# Patient Record
Sex: MALE | Race: OTHER | HISPANIC OR LATINO | URBAN - METROPOLITAN AREA
[De-identification: names, ages, dates, MRNs, and addresses within clinical notes are randomized per-mention and may not be internally consistent; named-entity substitution may affect disease eponyms.]

---

## 2017-04-14 ENCOUNTER — INPATIENT (INPATIENT)
Facility: HOSPITAL | Age: 67
LOS: 0 days | Discharge: HOME | DRG: 247 | End: 2017-04-15
Attending: INTERNAL MEDICINE | Admitting: INTERNAL MEDICINE
Payer: COMMERCIAL

## 2017-04-14 VITALS
OXYGEN SATURATION: 96 % | RESPIRATION RATE: 16 BRPM | TEMPERATURE: 98 F | HEART RATE: 85 BPM | HEIGHT: 63 IN | WEIGHT: 186.95 LBS | DIASTOLIC BLOOD PRESSURE: 78 MMHG | SYSTOLIC BLOOD PRESSURE: 158 MMHG

## 2017-04-14 DIAGNOSIS — R93.1 ABNORMAL FINDINGS ON DIAGNOSTIC IMAGING OF HEART AND CORONARY CIRCULATION: ICD-10-CM

## 2017-04-14 DIAGNOSIS — Z87.81 PERSONAL HISTORY OF (HEALED) TRAUMATIC FRACTURE: Chronic | ICD-10-CM

## 2017-04-14 LAB
ALBUMIN SERPL ELPH-MCNC: 4.2 G/DL — SIGNIFICANT CHANGE UP (ref 3.3–5)
ALP SERPL-CCNC: 90 U/L — SIGNIFICANT CHANGE UP (ref 40–120)
ALT FLD-CCNC: 29 U/L RC — SIGNIFICANT CHANGE UP (ref 10–45)
ANION GAP SERPL CALC-SCNC: 12 MMOL/L — SIGNIFICANT CHANGE UP (ref 5–17)
AST SERPL-CCNC: 18 U/L — SIGNIFICANT CHANGE UP (ref 10–40)
BILIRUB SERPL-MCNC: 0.5 MG/DL — SIGNIFICANT CHANGE UP (ref 0.2–1.2)
BUN SERPL-MCNC: 38 MG/DL — HIGH (ref 7–23)
CALCIUM SERPL-MCNC: 9.2 MG/DL — SIGNIFICANT CHANGE UP (ref 8.4–10.5)
CHLORIDE SERPL-SCNC: 105 MMOL/L — SIGNIFICANT CHANGE UP (ref 96–108)
CO2 SERPL-SCNC: 27 MMOL/L — SIGNIFICANT CHANGE UP (ref 22–31)
CREAT SERPL-MCNC: 1.6 MG/DL — HIGH (ref 0.5–1.3)
GLUCOSE SERPL-MCNC: 84 MG/DL — SIGNIFICANT CHANGE UP (ref 70–99)
HCT VFR BLD CALC: 51 % — HIGH (ref 39–50)
HGB BLD-MCNC: 15.9 G/DL — SIGNIFICANT CHANGE UP (ref 13–17)
MCHC RBC-ENTMCNC: 28.9 PG — SIGNIFICANT CHANGE UP (ref 27–34)
MCHC RBC-ENTMCNC: 31.3 GM/DL — LOW (ref 32–36)
MCV RBC AUTO: 92.4 FL — SIGNIFICANT CHANGE UP (ref 80–100)
PLATELET # BLD AUTO: 233 K/UL — SIGNIFICANT CHANGE UP (ref 150–400)
POTASSIUM SERPL-MCNC: 4.8 MMOL/L — SIGNIFICANT CHANGE UP (ref 3.5–5.3)
POTASSIUM SERPL-SCNC: 4.8 MMOL/L — SIGNIFICANT CHANGE UP (ref 3.5–5.3)
PROT SERPL-MCNC: 7.3 G/DL — SIGNIFICANT CHANGE UP (ref 6–8.3)
RBC # BLD: 5.52 M/UL — SIGNIFICANT CHANGE UP (ref 4.2–5.8)
RBC # FLD: 13.8 % — SIGNIFICANT CHANGE UP (ref 10.3–14.5)
SODIUM SERPL-SCNC: 144 MMOL/L — SIGNIFICANT CHANGE UP (ref 135–145)
WBC # BLD: 9.3 K/UL — SIGNIFICANT CHANGE UP (ref 3.8–10.5)
WBC # FLD AUTO: 9.3 K/UL — SIGNIFICANT CHANGE UP (ref 3.8–10.5)

## 2017-04-14 PROCEDURE — 93010 ELECTROCARDIOGRAM REPORT: CPT

## 2017-04-14 RX ORDER — AMLODIPINE BESYLATE 2.5 MG/1
5 TABLET ORAL DAILY
Qty: 0 | Refills: 0 | Status: DISCONTINUED | OUTPATIENT
Start: 2017-04-14 | End: 2017-04-15

## 2017-04-14 RX ORDER — LOSARTAN POTASSIUM 100 MG/1
100 TABLET, FILM COATED ORAL DAILY
Qty: 0 | Refills: 0 | Status: DISCONTINUED | OUTPATIENT
Start: 2017-04-14 | End: 2017-04-15

## 2017-04-14 RX ORDER — PRASUGREL 5 MG/1
10 TABLET, FILM COATED ORAL DAILY
Qty: 0 | Refills: 0 | Status: DISCONTINUED | OUTPATIENT
Start: 2017-04-15 | End: 2017-04-15

## 2017-04-14 RX ORDER — INDOMETHACIN 50 MG
75 CAPSULE ORAL DAILY
Qty: 0 | Refills: 0 | Status: DISCONTINUED | OUTPATIENT
Start: 2017-04-14 | End: 2017-04-15

## 2017-04-14 RX ORDER — ATORVASTATIN CALCIUM 80 MG/1
40 TABLET, FILM COATED ORAL AT BEDTIME
Qty: 0 | Refills: 0 | Status: DISCONTINUED | OUTPATIENT
Start: 2017-04-14 | End: 2017-04-15

## 2017-04-14 RX ORDER — ASPIRIN/CALCIUM CARB/MAGNESIUM 324 MG
81 TABLET ORAL DAILY
Qty: 0 | Refills: 0 | Status: DISCONTINUED | OUTPATIENT
Start: 2017-04-14 | End: 2017-04-15

## 2017-04-14 RX ADMIN — ATORVASTATIN CALCIUM 40 MILLIGRAM(S): 80 TABLET, FILM COATED ORAL at 21:54

## 2017-04-14 NOTE — H&P CARDIOLOGY - HISTORY OF PRESENT ILLNESS
66 year old male with PMH HTN, gout TIA 6 months ago, no residual who presents with midsternal chest pain, unrelated to activity, non radiating. HAs had pain for "years" but endorses increase in pain over past 6 months, accompanied by dizziness. Recent Cardiac CT with calcium score >400. Calcium plaque seen throughout coronaries, including left main. Symptoms may suggest angina.   Nuclear stress test 3/7/17, which revealed EF 69%. Negative for stress induced ischemia, symptoms or arrythmia. No evidence of ischemia or antecedent infarction. Normal LV function. Seen and evaluated by Dr German New and referred for cardiac cath with possible anginal component.

## 2017-04-15 VITALS
OXYGEN SATURATION: 98 % | SYSTOLIC BLOOD PRESSURE: 147 MMHG | HEART RATE: 77 BPM | DIASTOLIC BLOOD PRESSURE: 103 MMHG | RESPIRATION RATE: 17 BRPM

## 2017-04-15 LAB
ANION GAP SERPL CALC-SCNC: 12 MMOL/L — SIGNIFICANT CHANGE UP (ref 5–17)
BUN SERPL-MCNC: 34 MG/DL — HIGH (ref 7–23)
CALCIUM SERPL-MCNC: 8.6 MG/DL — SIGNIFICANT CHANGE UP (ref 8.4–10.5)
CHLORIDE SERPL-SCNC: 105 MMOL/L — SIGNIFICANT CHANGE UP (ref 96–108)
CO2 SERPL-SCNC: 23 MMOL/L — SIGNIFICANT CHANGE UP (ref 22–31)
CREAT SERPL-MCNC: 1.41 MG/DL — HIGH (ref 0.5–1.3)
GLUCOSE SERPL-MCNC: 85 MG/DL — SIGNIFICANT CHANGE UP (ref 70–99)
HCT VFR BLD CALC: 49.2 % — SIGNIFICANT CHANGE UP (ref 39–50)
HGB BLD-MCNC: 16 G/DL — SIGNIFICANT CHANGE UP (ref 13–17)
MCHC RBC-ENTMCNC: 29.7 PG — SIGNIFICANT CHANGE UP (ref 27–34)
MCHC RBC-ENTMCNC: 32.4 GM/DL — SIGNIFICANT CHANGE UP (ref 32–36)
MCV RBC AUTO: 91.6 FL — SIGNIFICANT CHANGE UP (ref 80–100)
PLATELET # BLD AUTO: 207 K/UL — SIGNIFICANT CHANGE UP (ref 150–400)
POTASSIUM SERPL-MCNC: 4 MMOL/L — SIGNIFICANT CHANGE UP (ref 3.5–5.3)
POTASSIUM SERPL-SCNC: 4 MMOL/L — SIGNIFICANT CHANGE UP (ref 3.5–5.3)
RBC # BLD: 5.38 M/UL — SIGNIFICANT CHANGE UP (ref 4.2–5.8)
RBC # FLD: 13.6 % — SIGNIFICANT CHANGE UP (ref 10.3–14.5)
SODIUM SERPL-SCNC: 140 MMOL/L — SIGNIFICANT CHANGE UP (ref 135–145)
WBC # BLD: 8.6 K/UL — SIGNIFICANT CHANGE UP (ref 3.8–10.5)
WBC # FLD AUTO: 8.6 K/UL — SIGNIFICANT CHANGE UP (ref 3.8–10.5)

## 2017-04-15 PROCEDURE — C1725: CPT

## 2017-04-15 PROCEDURE — C1874: CPT

## 2017-04-15 PROCEDURE — 80053 COMPREHEN METABOLIC PANEL: CPT

## 2017-04-15 PROCEDURE — 80048 BASIC METABOLIC PNL TOTAL CA: CPT

## 2017-04-15 PROCEDURE — 93005 ELECTROCARDIOGRAM TRACING: CPT

## 2017-04-15 PROCEDURE — C1894: CPT

## 2017-04-15 PROCEDURE — C1769: CPT

## 2017-04-15 PROCEDURE — 85027 COMPLETE CBC AUTOMATED: CPT

## 2017-04-15 PROCEDURE — 93458 L HRT ARTERY/VENTRICLE ANGIO: CPT | Mod: 59

## 2017-04-15 PROCEDURE — 93010 ELECTROCARDIOGRAM REPORT: CPT

## 2017-04-15 PROCEDURE — C9600: CPT | Mod: RC

## 2017-04-15 PROCEDURE — C1887: CPT

## 2017-04-15 RX ORDER — ATORVASTATIN CALCIUM 80 MG/1
1 TABLET, FILM COATED ORAL
Qty: 30 | Refills: 0 | OUTPATIENT
Start: 2017-04-15 | End: 2017-05-15

## 2017-04-15 RX ORDER — ASPIRIN/CALCIUM CARB/MAGNESIUM 324 MG
1 TABLET ORAL
Qty: 0 | Refills: 0 | COMMUNITY
Start: 2017-04-15

## 2017-04-15 RX ORDER — PRASUGREL 5 MG/1
1 TABLET, FILM COATED ORAL
Qty: 90 | Refills: 3 | OUTPATIENT
Start: 2017-04-15 | End: 2018-04-09

## 2017-04-15 RX ORDER — PRASUGREL 5 MG/1
1 TABLET, FILM COATED ORAL
Qty: 30 | Refills: 0 | OUTPATIENT
Start: 2017-04-15 | End: 2017-05-15

## 2017-04-15 RX ADMIN — PRASUGREL 10 MILLIGRAM(S): 5 TABLET, FILM COATED ORAL at 05:51

## 2017-04-15 RX ADMIN — LOSARTAN POTASSIUM 100 MILLIGRAM(S): 100 TABLET, FILM COATED ORAL at 05:52

## 2017-04-15 RX ADMIN — Medication 81 MILLIGRAM(S): at 05:52

## 2017-04-15 RX ADMIN — AMLODIPINE BESYLATE 5 MILLIGRAM(S): 2.5 TABLET ORAL at 05:52

## 2017-04-15 NOTE — DISCHARGE NOTE ADULT - CARE PROVIDER_API CALL
German New), Cardiac Electrophysiology; Cardiovascular Disease  [address] [place] [address]  Phone: [phone]  Fax: [phone]

## 2017-04-15 NOTE — DISCHARGE NOTE ADULT - MEDICATION SUMMARY - MEDICATIONS TO TAKE
I will START or STAY ON the medications listed below when I get home from the hospital:    aspirin 81 mg oral delayed release tablet  -- 1 tab(s) by mouth once a day  -- Indication: For stented coronary artery    indomethacin 75 mg oral capsule, extended release  -- 1 cap(s) by mouth once a day  -- Indication: For pain    losartan 100 mg oral tablet  -- 1 tab(s) by mouth once a day  -- Indication: For Hypertension    atorvastatin 40 mg oral tablet  -- 1 tab(s) by mouth once a day (at bedtime)  -- Indication: For Hyperlipidemia    prasugrel 10 mg oral tablet  -- 1 tab(s) by mouth once a day  -- Indication: For stented coronary artery    Effient 10 mg oral tablet  -- 1 tab(s) by mouth once a day  -- Indication: For stented coronary artery    amLODIPine 5 mg oral tablet  -- 1 tab(s) by mouth once a day  -- Indication: For Hypertension

## 2017-04-15 NOTE — DISCHARGE NOTE ADULT - CARE PLAN
Principal Discharge DX:	Coronary artery disease of native artery of native heart with stable angina pectoris  Goal:	Patient remains chest pain free and understands post cath discharge instructions.  Instructions for follow-up, activity and diet:	No heavy lifting, strenuous activity, bending, straining, or unnecessary stair climbing for 2 weeks. No driving for 2 days. You may shower 24 hours following the procedure but avoid baths/swimming for 1 week. Check your groin site for bleeding and/or swelling daily following procedure and call your doctor immediately if it occurs or if you experience increased pain at the site. Follow up with your cardiologist in 1-2 weeks. You may call [facility] if you have any questions/concerns regarding your procedure [phone]. Do not stop you Aspirin or Effient unless instructed to do so by your cardiologist. Low salt, low fat diet.   Weight management.   Take medications as prescribed.    No smoking.  Follow up appointments with your doctor(s)  as instructed.  Secondary Diagnosis:	HTN (hypertension), benign  Goal:	Your blood pressure will be controlled.  Instructions for follow-up, activity and diet:	Continue with your blood pressure medications; eat a heart healthy diet with low salt diet; exercise regularly (consult with your physician or cardiologist first); maintain a heart healthy weight; if you smoke - quit (A resource to help you stop smoking is the [facility] [employer] – phone number [phone].); include healthy ways to manage stress. Continue to follow with your primary care physician or cardiologist.  Secondary Diagnosis:	Hyperlipidemia, unspecified hyperlipidemia type  Goal:	Your LDL cholesterol will be less than 70mg/dL  Instructions for follow-up, activity and diet:	Continue with your cholesterol medications. Eat a heart healthy diet that is low in saturated fats and salt, and includes whole grains, fruits, vegetables and lean protein; exercise regularly (consult with your physician or cardiologist first); maintain a heart healthy weight; if you smoke - quit (A resource to help you stop smoking is the [facility] [employer] – phone number [phone].). Continue to follow with your primary physician or cardiologist. Principal Discharge DX:	Coronary artery disease of native artery of native heart with stable angina pectoris  Goal:	Patient remains chest pain free and understands post cath discharge instructions.  Instructions for follow-up, activity and diet:	No heavy lifting, strenuous activity, bending, straining, or unnecessary stair climbing for 2 weeks. No driving for 2 days. You may shower 24 hours following the procedure but avoid baths/swimming for 1 week. Check your groin site for bleeding and/or swelling daily following procedure and call your doctor immediately if it occurs or if you experience increased pain at the site. Follow up with your cardiologist in 1-2 weeks. You may call [facility] if you have any questions/concerns regarding your procedure [phone]. Do not stop you Aspirin or Effient unless instructed to do so by your cardiologist. Low salt, low fat diet.   Weight management.   Take medications as prescribed.    No smoking.  Follow up appointments with your doctor(s)  as instructed.  Secondary Diagnosis:	HTN (hypertension), benign  Goal:	Your blood pressure will be controlled.  Instructions for follow-up, activity and diet:	Continue with your blood pressure medications; eat a heart healthy diet with low salt diet; exercise regularly (consult with your physician or cardiologist first); maintain a heart healthy weight; if you smoke - quit (A resource to help you stop smoking is the [facility] – phone number [phone].); include healthy ways to manage stress. Continue to follow with your primary care physician or cardiologist.  Secondary Diagnosis:	Hyperlipidemia, unspecified hyperlipidemia type  Goal:	Your LDL cholesterol will be less than 70mg/dL  Instructions for follow-up, activity and diet:	Continue with your cholesterol medications. Eat a heart healthy diet that is low in saturated fats and salt, and includes whole grains, fruits, vegetables and lean protein; exercise regularly (consult with your physician or cardiologist first); maintain a heart healthy weight; if you smoke - quit (A resource to help you stop smoking is the [facility] – phone number [phone].). Continue to follow with your primary physician or cardiologist.

## 2017-04-15 NOTE — DISCHARGE NOTE ADULT - PATIENT PORTAL LINK FT
“You can access the Follow[facility] Patient Portal, offered by [facility], by registering with the following website: [email or web address]”

## 2017-04-15 NOTE — DISCHARGE NOTE ADULT - HOSPITAL COURSE
66 year old male with PMH HTN, gout TIA 6 months ago, no residual who presents with midsternal chest pain, unrelated to activity, non radiating. HAs had pain for "years" but endorses increase in pain over past 6 months, accompanied by dizziness. Recent Cardiac CT with calcium score >400. Calcium plaque seen throughout coronaries, including left main. Symptoms may suggest angina.   Nuclear stress test 3/7/17, which revealed EF 69%. Negative for stress induced ischemia, symptoms or arrythmia. No evidence of ischemia or antecedent infarction. Normal LV function. Seen and evaluated by Dr German New and referred for cardiac cath with possible anginal component.    4/14 cardiac cath with stent to the mid RCA. Right femoral insertion site without swelling, bleeding.

## 2017-04-15 NOTE — DISCHARGE NOTE ADULT - PLAN OF CARE
Patient remains chest pain free and understands post cath discharge instructions. No heavy lifting, strenuous activity, bending, straining, or unnecessary stair climbing for 2 weeks. No driving for 2 days. You may shower 24 hours following the procedure but avoid baths/swimming for 1 week. Check your groin site for bleeding and/or swelling daily following procedure and call your doctor immediately if it occurs or if you experience increased pain at the site. Follow up with your cardiologist in 1-2 weeks. You may call [facility] if you have any questions/concerns regarding your procedure [phone]. Do not stop you Aspirin or Effient unless instructed to do so by your cardiologist. Low salt, low fat diet.   Weight management.   Take medications as prescribed.    No smoking.  Follow up appointments with your doctor(s)  as instructed. Your blood pressure will be controlled. Continue with your blood pressure medications; eat a heart healthy diet with low salt diet; exercise regularly (consult with your physician or cardiologist first); maintain a heart healthy weight; if you smoke - quit (A resource to help you stop smoking is the [facility] – phone number [phone].); include healthy ways to manage stress. Continue to follow with your primary care physician or cardiologist. Your LDL cholesterol will be less than 70mg/dL Continue with your cholesterol medications. Eat a heart healthy diet that is low in saturated fats and salt, and includes whole grains, fruits, vegetables and lean protein; exercise regularly (consult with your physician or cardiologist first); maintain a heart healthy weight; if you smoke - quit (A resource to help you stop smoking is the [facility] – phone number [phone].). Continue to follow with your primary physician or cardiologist.

## 2017-04-29 ENCOUNTER — EMERGENCY (EMERGENCY)
Facility: HOSPITAL | Age: 67
LOS: 0 days | Discharge: HOME | End: 2017-04-29
Attending: EMERGENCY MEDICINE | Admitting: EMERGENCY MEDICINE
Payer: MEDICARE

## 2017-04-29 VITALS
DIASTOLIC BLOOD PRESSURE: 66 MMHG | TEMPERATURE: 98 F | WEIGHT: 188.94 LBS | RESPIRATION RATE: 15 BRPM | SYSTOLIC BLOOD PRESSURE: 118 MMHG | HEART RATE: 88 BPM | HEIGHT: 63 IN | OXYGEN SATURATION: 98 %

## 2017-04-29 VITALS
DIASTOLIC BLOOD PRESSURE: 62 MMHG | OXYGEN SATURATION: 98 % | SYSTOLIC BLOOD PRESSURE: 118 MMHG | HEART RATE: 75 BPM | RESPIRATION RATE: 17 BRPM | TEMPERATURE: 99 F

## 2017-04-29 DIAGNOSIS — R07.9 CHEST PAIN, UNSPECIFIED: ICD-10-CM

## 2017-04-29 DIAGNOSIS — Z87.81 PERSONAL HISTORY OF (HEALED) TRAUMATIC FRACTURE: Chronic | ICD-10-CM

## 2017-04-29 LAB
ALBUMIN SERPL ELPH-MCNC: 3.2 G/DL — LOW (ref 3.3–5)
ALP SERPL-CCNC: 84 U/L — SIGNIFICANT CHANGE UP (ref 40–120)
ALT FLD-CCNC: 28 U/L — SIGNIFICANT CHANGE UP (ref 12–78)
ANION GAP SERPL CALC-SCNC: 10 MMOL/L — SIGNIFICANT CHANGE UP (ref 5–17)
APTT BLD: 30.8 SEC — SIGNIFICANT CHANGE UP (ref 27.5–37.4)
AST SERPL-CCNC: 12 U/L — LOW (ref 15–37)
BASOPHILS # BLD AUTO: 0.1 K/UL — SIGNIFICANT CHANGE UP (ref 0–0.2)
BASOPHILS NFR BLD AUTO: 1 % — SIGNIFICANT CHANGE UP (ref 0–2)
BILIRUB SERPL-MCNC: 0.4 MG/DL — SIGNIFICANT CHANGE UP (ref 0.2–1.2)
BUN SERPL-MCNC: 36 MG/DL — HIGH (ref 7–23)
CALCIUM SERPL-MCNC: 8.5 MG/DL — SIGNIFICANT CHANGE UP (ref 8.5–10.1)
CHLORIDE SERPL-SCNC: 107 MMOL/L — SIGNIFICANT CHANGE UP (ref 96–108)
CK SERPL-CCNC: 55 U/L — SIGNIFICANT CHANGE UP (ref 26–308)
CO2 SERPL-SCNC: 25 MMOL/L — SIGNIFICANT CHANGE UP (ref 22–31)
CREAT SERPL-MCNC: 1.51 MG/DL — HIGH (ref 0.5–1.3)
EOSINOPHIL # BLD AUTO: 0.3 K/UL — SIGNIFICANT CHANGE UP (ref 0–0.5)
EOSINOPHIL NFR BLD AUTO: 2.2 % — SIGNIFICANT CHANGE UP (ref 0–6)
GLUCOSE SERPL-MCNC: 114 MG/DL — HIGH (ref 70–99)
HCT VFR BLD CALC: 42.2 % — SIGNIFICANT CHANGE UP (ref 39–50)
HGB BLD-MCNC: 13.6 G/DL — SIGNIFICANT CHANGE UP (ref 13–17)
INR BLD: 1.11 RATIO — SIGNIFICANT CHANGE UP (ref 0.88–1.16)
LYMPHOCYTES # BLD AUTO: 1.4 K/UL — SIGNIFICANT CHANGE UP (ref 1–3.3)
LYMPHOCYTES # BLD AUTO: 10.8 % — LOW (ref 13–44)
MCHC RBC-ENTMCNC: 29 PG — SIGNIFICANT CHANGE UP (ref 27–34)
MCHC RBC-ENTMCNC: 32.4 GM/DL — SIGNIFICANT CHANGE UP (ref 32–36)
MCV RBC AUTO: 89.5 FL — SIGNIFICANT CHANGE UP (ref 80–100)
MONOCYTES # BLD AUTO: 1 K/UL — HIGH (ref 0–0.9)
MONOCYTES NFR BLD AUTO: 7.9 % — SIGNIFICANT CHANGE UP (ref 2–14)
NEUTROPHILS # BLD AUTO: 9.9 K/UL — HIGH (ref 1.8–7.4)
NEUTROPHILS NFR BLD AUTO: 78 % — HIGH (ref 43–77)
NT-PROBNP SERPL-SCNC: 311 PG/ML — HIGH (ref 0–125)
PLATELET # BLD AUTO: 297 K/UL — SIGNIFICANT CHANGE UP (ref 150–400)
POTASSIUM SERPL-MCNC: 4.2 MMOL/L — SIGNIFICANT CHANGE UP (ref 3.5–5.3)
POTASSIUM SERPL-SCNC: 4.2 MMOL/L — SIGNIFICANT CHANGE UP (ref 3.5–5.3)
PROT SERPL-MCNC: 6.8 GM/DL — SIGNIFICANT CHANGE UP (ref 6–8.3)
PROTHROM AB SERPL-ACNC: 12 SEC — SIGNIFICANT CHANGE UP (ref 9.8–12.7)
RBC # BLD: 4.71 M/UL — SIGNIFICANT CHANGE UP (ref 4.2–5.8)
RBC # FLD: 13.7 % — SIGNIFICANT CHANGE UP (ref 10.3–14.5)
SODIUM SERPL-SCNC: 142 MMOL/L — SIGNIFICANT CHANGE UP (ref 135–145)
TROPONIN I SERPL-MCNC: <0.015 NG/ML — SIGNIFICANT CHANGE UP (ref 0.01–0.04)
TROPONIN I SERPL-MCNC: <0.015 NG/ML — SIGNIFICANT CHANGE UP (ref 0.01–0.04)
WBC # BLD: 12.7 K/UL — HIGH (ref 3.8–10.5)
WBC # FLD AUTO: 12.7 K/UL — HIGH (ref 3.8–10.5)

## 2017-04-29 PROCEDURE — 93010 ELECTROCARDIOGRAM REPORT: CPT

## 2017-04-29 PROCEDURE — 71010: CPT | Mod: 26

## 2017-04-29 PROCEDURE — 99284 EMERGENCY DEPT VISIT MOD MDM: CPT

## 2017-04-29 RX ORDER — SODIUM CHLORIDE 9 MG/ML
3 INJECTION INTRAMUSCULAR; INTRAVENOUS; SUBCUTANEOUS ONCE
Qty: 0 | Refills: 0 | Status: DISCONTINUED | OUTPATIENT
Start: 2017-04-29 | End: 2017-04-29

## 2017-04-29 NOTE — ED ADULT NURSE NOTE - ED STAT RN HANDOFF DETAILS
report received from Pau ORTIZ. patient resting comfortable. no complaints at this time. safety maintained, will continue to monitor.

## 2017-04-29 NOTE — ED PROVIDER NOTE - MEDICAL DECISION MAKING DETAILS
plan: chest pain with recent cardiac stent from April 13. ekg normal. will keep for two cardiac enzymes as pt now has no chest pain

## 2017-04-29 NOTE — ED PROVIDER NOTE - NS ED MD SCRIBE ATTENDING SCRIBE SECTIONS
VITAL SIGNS( Pullset)/PHYSICAL EXAM/HISTORY OF PRESENT ILLNESS/PAST MEDICAL/SURGICAL/SOCIAL HISTORY/RESULTS/REVIEW OF SYSTEMS/PROGRESS NOTE

## 2017-04-29 NOTE — ED PROVIDER NOTE - OBJECTIVE STATEMENT
67 y/o male pmhx stent placed April 13, 2017 presents to ED BIBA due to constant chest pain since yesterday 11 pm. Pain radiates into left shoulder, left arm and neck. Pt took 4 Aspirin PTA. On route to the ER pt received 1 Nitroglycerin, with relief of sx. Before Nitro pt states pain was an 8/10. Here in ER pain is at a 4/10.

## 2017-04-29 NOTE — ED PROVIDER NOTE - DETAILS:
I, Maribell Canales, performed the initial face to face bedside interview with this patient regarding history of present illness, review of symptoms and relevant past medical, social and family history.  I completed an independent physical examination.  I was the initial provider who evaluated this patient. The history, relevant review of systems, past medical and surgical history, medical decision making, and physical examination was documented by the scribe in my presence and I attest to the accuracy of the documentation.

## 2017-04-29 NOTE — ED PROVIDER NOTE - PROGRESS NOTE DETAILS
I, Elvia Manning am scribing for and in the presence of Dr. Asif for this pt. Pt with no pain will cardiac enzyme if negative will d/c. Doubt cp is due to cardiac reasons, given recent stent. pt has no chest pain 2 negative cardiac enzymes will d/c tohome with family

## 2017-04-29 NOTE — ED ADULT TRIAGE NOTE - CHIEF COMPLAINT QUOTE
Started last night took 4 baby aspirin at home. Given nitro in the ambulance pain improved. had a stent placed at the beginning of the month

## 2017-11-10 PROBLEM — I10 ESSENTIAL (PRIMARY) HYPERTENSION: Chronic | Status: ACTIVE | Noted: 2017-04-14

## 2017-11-22 PROBLEM — Z00.00 ENCOUNTER FOR PREVENTIVE HEALTH EXAMINATION: Status: ACTIVE | Noted: 2017-11-22

## 2017-11-28 ENCOUNTER — OUTPATIENT (OUTPATIENT)
Dept: OUTPATIENT SERVICES | Facility: HOSPITAL | Age: 67
LOS: 1 days | End: 2017-11-28
Payer: COMMERCIAL

## 2017-11-28 ENCOUNTER — APPOINTMENT (OUTPATIENT)
Dept: MRI IMAGING | Facility: CLINIC | Age: 67
End: 2017-11-28
Payer: COMMERCIAL

## 2017-11-28 DIAGNOSIS — Z00.8 ENCOUNTER FOR OTHER GENERAL EXAMINATION: ICD-10-CM

## 2017-11-28 DIAGNOSIS — Z87.81 PERSONAL HISTORY OF (HEALED) TRAUMATIC FRACTURE: Chronic | ICD-10-CM

## 2017-11-28 PROCEDURE — 70551 MRI BRAIN STEM W/O DYE: CPT

## 2017-11-28 PROCEDURE — 70551 MRI BRAIN STEM W/O DYE: CPT | Mod: 26

## 2018-07-30 ENCOUNTER — OUTPATIENT (OUTPATIENT)
Dept: OUTPATIENT SERVICES | Facility: HOSPITAL | Age: 68
LOS: 1 days | End: 2018-07-30
Payer: MEDICARE

## 2018-07-30 VITALS
OXYGEN SATURATION: 98 % | TEMPERATURE: 97 F | HEART RATE: 69 BPM | SYSTOLIC BLOOD PRESSURE: 170 MMHG | RESPIRATION RATE: 18 BRPM | DIASTOLIC BLOOD PRESSURE: 86 MMHG

## 2018-07-30 VITALS — WEIGHT: 188.94 LBS

## 2018-07-30 DIAGNOSIS — R94.39 ABNORMAL RESULT OF OTHER CARDIOVASCULAR FUNCTION STUDY: ICD-10-CM

## 2018-07-30 DIAGNOSIS — Z87.81 PERSONAL HISTORY OF (HEALED) TRAUMATIC FRACTURE: Chronic | ICD-10-CM

## 2018-07-30 DIAGNOSIS — S69.90XA UNSPECIFIED INJURY OF UNSPECIFIED WRIST, HAND AND FINGER(S), INITIAL ENCOUNTER: Chronic | ICD-10-CM

## 2018-07-30 DIAGNOSIS — Z01.810 ENCOUNTER FOR PREPROCEDURAL CARDIOVASCULAR EXAMINATION: ICD-10-CM

## 2018-07-30 DIAGNOSIS — I20.9 ANGINA PECTORIS, UNSPECIFIED: ICD-10-CM

## 2018-07-30 LAB
ALBUMIN SERPL ELPH-MCNC: 4.2 G/DL — SIGNIFICANT CHANGE UP (ref 3.3–5.2)
ALP SERPL-CCNC: 102 U/L — SIGNIFICANT CHANGE UP (ref 40–120)
ALT FLD-CCNC: 26 U/L — SIGNIFICANT CHANGE UP
ANION GAP SERPL CALC-SCNC: 12 MMOL/L — SIGNIFICANT CHANGE UP (ref 5–17)
APTT BLD: 33.4 SEC — SIGNIFICANT CHANGE UP (ref 27.5–37.4)
AST SERPL-CCNC: 20 U/L — SIGNIFICANT CHANGE UP
BASOPHILS # BLD AUTO: 0 K/UL — SIGNIFICANT CHANGE UP (ref 0–0.2)
BASOPHILS NFR BLD AUTO: 0.5 % — SIGNIFICANT CHANGE UP (ref 0–2)
BILIRUB SERPL-MCNC: 0.5 MG/DL — SIGNIFICANT CHANGE UP (ref 0.4–2)
BUN SERPL-MCNC: 34 MG/DL — HIGH (ref 8–20)
CALCIUM SERPL-MCNC: 9.2 MG/DL — SIGNIFICANT CHANGE UP (ref 8.6–10.2)
CHLORIDE SERPL-SCNC: 106 MMOL/L — SIGNIFICANT CHANGE UP (ref 98–107)
CO2 SERPL-SCNC: 27 MMOL/L — SIGNIFICANT CHANGE UP (ref 22–29)
CREAT SERPL-MCNC: 1.48 MG/DL — HIGH (ref 0.5–1.3)
EOSINOPHIL # BLD AUTO: 0.3 K/UL — SIGNIFICANT CHANGE UP (ref 0–0.5)
EOSINOPHIL NFR BLD AUTO: 4 % — SIGNIFICANT CHANGE UP (ref 0–5)
GLUCOSE SERPL-MCNC: 92 MG/DL — SIGNIFICANT CHANGE UP (ref 70–115)
HCT VFR BLD CALC: 50.7 % — SIGNIFICANT CHANGE UP (ref 42–52)
HGB BLD-MCNC: 15.8 G/DL — SIGNIFICANT CHANGE UP (ref 14–18)
LYMPHOCYTES # BLD AUTO: 1.6 K/UL — SIGNIFICANT CHANGE UP (ref 1–4.8)
LYMPHOCYTES # BLD AUTO: 18.6 % — LOW (ref 20–55)
MAGNESIUM SERPL-MCNC: 2.4 MG/DL — SIGNIFICANT CHANGE UP (ref 1.6–2.6)
MCHC RBC-ENTMCNC: 28 PG — SIGNIFICANT CHANGE UP (ref 27–31)
MCHC RBC-ENTMCNC: 31.2 G/DL — LOW (ref 32–36)
MCV RBC AUTO: 89.7 FL — SIGNIFICANT CHANGE UP (ref 80–94)
MONOCYTES # BLD AUTO: 0.5 K/UL — SIGNIFICANT CHANGE UP (ref 0–0.8)
MONOCYTES NFR BLD AUTO: 6.2 % — SIGNIFICANT CHANGE UP (ref 3–10)
NEUTROPHILS # BLD AUTO: 6 K/UL — SIGNIFICANT CHANGE UP (ref 1.8–8)
NEUTROPHILS NFR BLD AUTO: 70.2 % — SIGNIFICANT CHANGE UP (ref 37–73)
PLATELET # BLD AUTO: 219 K/UL — SIGNIFICANT CHANGE UP (ref 150–400)
POTASSIUM SERPL-MCNC: 4.9 MMOL/L — SIGNIFICANT CHANGE UP (ref 3.5–5.3)
POTASSIUM SERPL-SCNC: 4.9 MMOL/L — SIGNIFICANT CHANGE UP (ref 3.5–5.3)
PROT SERPL-MCNC: 6.9 G/DL — SIGNIFICANT CHANGE UP (ref 6.6–8.7)
RBC # BLD: 5.65 M/UL — SIGNIFICANT CHANGE UP (ref 4.6–6.2)
RBC # FLD: 15.4 % — SIGNIFICANT CHANGE UP (ref 11–15.6)
SODIUM SERPL-SCNC: 145 MMOL/L — SIGNIFICANT CHANGE UP (ref 135–145)
WBC # BLD: 8.5 K/UL — SIGNIFICANT CHANGE UP (ref 4.8–10.8)
WBC # FLD AUTO: 8.5 K/UL — SIGNIFICANT CHANGE UP (ref 4.8–10.8)

## 2018-07-30 PROCEDURE — 93010 ELECTROCARDIOGRAM REPORT: CPT

## 2018-07-30 RX ORDER — AMLODIPINE BESYLATE 2.5 MG/1
1 TABLET ORAL
Qty: 0 | Refills: 0 | COMMUNITY

## 2018-07-30 NOTE — H&P PST ADULT - HISTORY OF PRESENT ILLNESS
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 The last several weeks has been experiencing stuttering chest pain at rest radiating to left jaw and shoulders. NTG taken on one of the episodes with relief  Saw Dr. New on July 6 2018 and underwent stress ECHO on July 26 2018 with no acute findings.  Imdur 30 was prescribed at that time but pt did not fill and take as he states he had NTG if he needed it  Anginal class IV equivalent due to chest pain at rest with radiation to left jaw and silvia shoulder.    PMH: CAD/PCI mRCA, obesity, gout, CRI

## 2018-07-30 NOTE — H&P PST ADULT - MS EXT PE MLT D E PC
----- Message from Dejah Casey sent at 10/25/2017  3:20 PM EDT -----  Regarding: Non-Urgent Medical Question  Contact: [phone]  [facility] Dr. Sunny Ricardo,    I have joined [facility] and [employer].  Each fitness center would like to have a statement confirming I am healthy enough to exercise.  I can take the statement in on my first official visit.  [employer]. said for me to use the recumbent bicycle and the recumbent stepper.  These two exercise machines will not put stress on my knees.      Thank you,  Dejah Casey   1943  [address] [place] [address] [id number]  [place], [place]   [phone]   normal/no pedal edema/no cyanosis/no clubbing

## 2018-07-30 NOTE — H&P PST ADULT - NEGATIVE NEUROLOGICAL SYMPTOMS
no paresthesias/no facial palsy/no generalized seizures/no syncope/no tremors/no loss of consciousness/no hemiparesis/no vertigo/no loss of sensation/no difficulty walking/no confusion/no headache/no focal seizures/no transient paralysis/no weakness

## 2018-07-30 NOTE — H&P PST ADULT - NEGATIVE OPHTHALMOLOGIC SYMPTOMS
no lacrimation L/no photophobia/no lacrimation R/no blurred vision L/no blurred vision R/no diplopia

## 2018-07-30 NOTE — H&P PST ADULT - NEGATIVE ENMT SYMPTOMS
no hearing difficulty/no tinnitus/no ear pain/no vertigo/no recurrent cold sores/no sinus symptoms/no nasal discharge

## 2018-07-30 NOTE — H&P PST ADULT - ASSESSMENT
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

## 2018-07-30 NOTE — H&P PST ADULT - NEUROLOGICAL DETAILS
responds to pain/deep reflexes intact/cranial nerves intact/alert and oriented x 3/responds to verbal commands

## 2018-07-31 ENCOUNTER — INPATIENT (INPATIENT)
Facility: HOSPITAL | Age: 68
LOS: 0 days | Discharge: HOME | DRG: 247 | End: 2018-08-01
Attending: INTERNAL MEDICINE | Admitting: INTERNAL MEDICINE
Payer: COMMERCIAL

## 2018-07-31 DIAGNOSIS — Z01.810 ENCOUNTER FOR PREPROCEDURAL CARDIOVASCULAR EXAMINATION: ICD-10-CM

## 2018-07-31 DIAGNOSIS — Z87.81 PERSONAL HISTORY OF (HEALED) TRAUMATIC FRACTURE: Chronic | ICD-10-CM

## 2018-07-31 DIAGNOSIS — S69.90XA UNSPECIFIED INJURY OF UNSPECIFIED WRIST, HAND AND FINGER(S), INITIAL ENCOUNTER: Chronic | ICD-10-CM

## 2018-07-31 DIAGNOSIS — R94.39 ABNORMAL RESULT OF OTHER CARDIOVASCULAR FUNCTION STUDY: ICD-10-CM

## 2018-07-31 PROBLEM — M10.9 GOUT, UNSPECIFIED: Chronic | Status: INACTIVE | Noted: 2017-04-14 | Resolved: 2018-07-30

## 2018-07-31 LAB
BLD GP AB SCN SERPL QL: SIGNIFICANT CHANGE UP
TYPE + AB SCN PNL BLD: SIGNIFICANT CHANGE UP

## 2018-07-31 PROCEDURE — 93010 ELECTROCARDIOGRAM REPORT: CPT

## 2018-07-31 RX ORDER — ATORVASTATIN CALCIUM 80 MG/1
40 TABLET, FILM COATED ORAL AT BEDTIME
Qty: 0 | Refills: 0 | Status: DISCONTINUED | OUTPATIENT
Start: 2018-07-31 | End: 2018-08-01

## 2018-07-31 RX ORDER — SODIUM CHLORIDE 9 MG/ML
250 INJECTION INTRAMUSCULAR; INTRAVENOUS; SUBCUTANEOUS ONCE
Qty: 0 | Refills: 0 | Status: COMPLETED | OUTPATIENT
Start: 2018-07-31 | End: 2018-07-31

## 2018-07-31 RX ORDER — INDOMETHACIN 50 MG
1 CAPSULE ORAL
Qty: 0 | Refills: 0 | COMMUNITY

## 2018-07-31 RX ORDER — CLOPIDOGREL BISULFATE 75 MG/1
75 TABLET, FILM COATED ORAL DAILY
Qty: 0 | Refills: 0 | Status: DISCONTINUED | OUTPATIENT
Start: 2018-08-01 | End: 2018-08-01

## 2018-07-31 RX ORDER — LOSARTAN POTASSIUM 100 MG/1
1 TABLET, FILM COATED ORAL
Qty: 0 | Refills: 0 | COMMUNITY

## 2018-07-31 RX ORDER — ASPIRIN/CALCIUM CARB/MAGNESIUM 324 MG
81 TABLET ORAL DAILY
Qty: 0 | Refills: 0 | Status: DISCONTINUED | OUTPATIENT
Start: 2018-07-31 | End: 2018-08-01

## 2018-07-31 RX ORDER — LOSARTAN POTASSIUM 100 MG/1
100 TABLET, FILM COATED ORAL DAILY
Qty: 0 | Refills: 0 | Status: DISCONTINUED | OUTPATIENT
Start: 2018-07-31 | End: 2018-08-01

## 2018-07-31 RX ADMIN — SODIUM CHLORIDE 250 MILLILITER(S): 9 INJECTION INTRAMUSCULAR; INTRAVENOUS; SUBCUTANEOUS at 16:12

## 2018-07-31 RX ADMIN — ATORVASTATIN CALCIUM 40 MILLIGRAM(S): 80 TABLET, FILM COATED ORAL at 21:48

## 2018-07-31 NOTE — PROGRESS NOTE ADULT - SUBJECTIVE AND OBJECTIVE BOX
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 The last several weeks has been experiencing stuttering chest pain at rest radiating to left jaw and shoulders. NTG taken on one of the episodes with relief  Saw Dr. New on July 6 2018 and underwent stress ECHO on July 26 2018 with no acute findings.  Imdur 30 was prescribed at that time but pt did not fill and take as he states he had NTG if he needed it  Anginal class IV equivalent due to chest pain at rest with radiation to left jaw and silvia shoulder.    PMH: CAD/PCI mRCA, obesity, gout, CRI  S/P ARIANNA to RCA  aspirin and plavix protocol emphasized.  Post procedure teaching done  Patient verbalizes understanding  Admit to telemetry overnight  Monitor for bleeding at site and arrthymias  If stable possible d/c home in the am  12 lead EKG NSB hr 51 no ectopy  q wave III, Avf

## 2018-07-31 NOTE — CONSULT NOTE ADULT - SUBJECTIVE AND OBJECTIVE BOX
Patient is a 67y old  Male who presents with a chief complaint of chest pain    HPI:  67 year old male with a history of Hypertension, hyperlipidemia and CAD s/p RCA stent in April of 2017 complaining or recurrent chest tightness with radiation to bilaterl arms at times waking him up from sleep, similar to his previous angina.  He had an abnormal stress echo.    PAST MEDICAL & SURGICAL HISTORY:  Abnormal stress echo  THOMAS (dyspnea on exertion)  Dizziness  Former smoker  Eye abnormality: left eye skin overgrowth  Right elbow pain: cyst on elbow  Right hand fracture  Gout  HTN (hypertension)  Angina pectoris: class IV  CAD (coronary artery disease)  CRI (chronic renal insufficiency)  Morbid obesity  Stented coronary artery: mRCA 3.0 x 24 SYNERGY April 2017  Hypertension  Injury of hand: right hand  History of broken nose        Allergies    No Known Allergies    Intolerances        MEDICATIONS  (STANDING):  aspirin  chewable 81 milliGRAM(s) Oral daily  atorvastatin 40 milliGRAM(s) Oral at bedtime  losartan 100 milliGRAM(s) Oral daily    MEDICATIONS  (PRN):    aspirin  chewable 81 milliGRAM(s) Oral daily  atorvastatin 40 milliGRAM(s) Oral at bedtime  losartan 100 milliGRAM(s) Oral daily      FAMILY HISTORY:  Family history of early CAD: CABG  Family history of diabetes mellitus  Family history of CABG      SOCIAL HISTORY:    CIGARETTES: Former    ALCOHOL:  Occasional    REVIEW OF SYSTEMS:  CONSTITUTIONAL: No fever, weight loss, or fatigue  EYES: No eye pain, visual disturbances, or discharge  ENMT:  No difficulty hearing, tinnitus, vertigo; No sinus or throat pain  NECK: No pain or stiffness  RESPIRATORY: No cough, wheezing, chills or hemoptysis; No Shortness of Breath  CARDIOVASCULAR: No chest pain, palpitations, passing out, dizziness, or leg swelling  GASTROINTESTINAL: No abdominal or epigastric pain. No nausea, vomiting, or hematemesis; No diarrhea or constipation. No melena or hematochezia.  GENITOURINARY: No dysuria, frequency, hematuria, or incontinence  NEUROLOGICAL: No headaches, memory loss, loss of strength, numbness, or tremors  SKIN: No itching, burning, rashes, or lesions   LYMPH Nodes: No enlarged glands  ENDOCRINE: No heat or cold intolerance; No hair loss  MUSCULOSKELETAL: No joint pain or swelling; No muscle, back, or extremity pain  PSYCHIATRIC: No depression, anxiety, mood swings, or difficulty sleeping  HEME/LYMPH: No easy bruising, or bleeding gums  ALLERY AND IMMUNOLOGIC: No hives or eczema	    Vital Signs Last 24 Hrs  T(C): 36.7 (31 Jul 2018 09:35), Max: 36.7 (31 Jul 2018 09:35)  T(F): 98 (31 Jul 2018 09:35), Max: 98 (31 Jul 2018 09:35)  HR: 63 (31 Jul 2018 17:01) (48 - 63)  BP: 128/64 (31 Jul 2018 17:01) (128/64 - 146/84)  BP(mean): --  RR: 18 (31 Jul 2018 17:01) (17 - 20)  SpO2: 95% (31 Jul 2018 17:01) (94% - 97%)    Daily     Daily     I&O's Detail      PHYSICAL EXAM:  Appearance: Normal, well nourished	  HEENT:   Normal oral mucosa, PERRL, EOMI, sclera non-icteric	  Lymphatic: No cervical lymphadenopathy  Cardiovascular: Normal S1 S2, No JVD, No cardiac murmurs, No carotid bruits, No peripheral edema  Respiratory: Lungs clear to auscultation	  Psychiatry: A & O x 3, Mood & affect appropriate  Gastrointestinal:  Soft, Non-tender, + BS, no bruits	  Skin: No rashes, No ecchymoses, No cyanosis  Neurologic: Grossly non-focal with full strength in all four extremities  Extremities: Normal range of motion, No clubbing, cyanosis or edema  Vascular: Peripheral pulses palpable 2+ bilaterally  LABS:                        15.8   8.5   )-----------( 219      ( 30 Jul 2018 08:47 )             50.7     07-30    145  |  106  |  34.0<H>  ----------------------------<  92  4.9   |  27.0  |  1.48<H>    Ca    9.2      30 Jul 2018 08:47  Mg     2.4     07-30    TPro  6.9  /  Alb  4.2  /  TBili  0.5  /  DBili  x   /  AST  20  /  ALT  26  /  AlkPhos  102  07-30        PTT - ( 30 Jul 2018 08:47 )  PTT:33.4 sec    I&O's Summary    BNP  RADIOLOGY & ADDITIONAL STUDIES:    Assessment:  67 year old male with a history of Hypertension, hyperlipidemia and CAD s/p RCA stent in April of 2017 complaining or recurrent chest tightness with radiation to bilaterl arms at times waking him up from sleep, similar to his previous angina.  He had an abnormal stress echo.        Plan:  Cardiac catheterization and possible percutaneous intervention recommended.  Risks, benefits, and alternatives reviewed.  Risks including but not limited to MI, death, stroke, bleeding, infection, vessel injury, hematoma, renal failure, allergic reaction, urgent open heart surgery, restenosis and stent thrombosis were reviewed.  All questions answered.  Patient is agreeable to proceed.

## 2018-08-01 ENCOUNTER — TRANSCRIPTION ENCOUNTER (OUTPATIENT)
Age: 68
End: 2018-08-01

## 2018-08-01 VITALS
TEMPERATURE: 98 F | HEART RATE: 81 BPM | DIASTOLIC BLOOD PRESSURE: 74 MMHG | SYSTOLIC BLOOD PRESSURE: 132 MMHG | RESPIRATION RATE: 18 BRPM

## 2018-08-01 LAB
ANION GAP SERPL CALC-SCNC: 11 MMOL/L — SIGNIFICANT CHANGE UP (ref 5–17)
BASOPHILS # BLD AUTO: 0 K/UL — SIGNIFICANT CHANGE UP (ref 0–0.2)
BASOPHILS NFR BLD AUTO: 0.2 % — SIGNIFICANT CHANGE UP (ref 0–2)
BUN SERPL-MCNC: 30 MG/DL — HIGH (ref 8–20)
CALCIUM SERPL-MCNC: 8.8 MG/DL — SIGNIFICANT CHANGE UP (ref 8.6–10.2)
CHLORIDE SERPL-SCNC: 103 MMOL/L — SIGNIFICANT CHANGE UP (ref 98–107)
CO2 SERPL-SCNC: 26 MMOL/L — SIGNIFICANT CHANGE UP (ref 22–29)
CREAT SERPL-MCNC: 1.37 MG/DL — HIGH (ref 0.5–1.3)
EOSINOPHIL # BLD AUTO: 0.3 K/UL — SIGNIFICANT CHANGE UP (ref 0–0.5)
EOSINOPHIL NFR BLD AUTO: 3.6 % — SIGNIFICANT CHANGE UP (ref 0–5)
GLUCOSE SERPL-MCNC: 81 MG/DL — SIGNIFICANT CHANGE UP (ref 70–115)
HCT VFR BLD CALC: 49.9 % — SIGNIFICANT CHANGE UP (ref 42–52)
HGB BLD-MCNC: 15.8 G/DL — SIGNIFICANT CHANGE UP (ref 14–18)
LYMPHOCYTES # BLD AUTO: 1.4 K/UL — SIGNIFICANT CHANGE UP (ref 1–4.8)
LYMPHOCYTES # BLD AUTO: 16.6 % — LOW (ref 20–55)
MCHC RBC-ENTMCNC: 28.2 PG — SIGNIFICANT CHANGE UP (ref 27–31)
MCHC RBC-ENTMCNC: 31.7 G/DL — LOW (ref 32–36)
MCV RBC AUTO: 89.1 FL — SIGNIFICANT CHANGE UP (ref 80–94)
MONOCYTES # BLD AUTO: 0.7 K/UL — SIGNIFICANT CHANGE UP (ref 0–0.8)
MONOCYTES NFR BLD AUTO: 8.5 % — SIGNIFICANT CHANGE UP (ref 3–10)
NEUTROPHILS # BLD AUTO: 6 K/UL — SIGNIFICANT CHANGE UP (ref 1.8–8)
NEUTROPHILS NFR BLD AUTO: 70.6 % — SIGNIFICANT CHANGE UP (ref 37–73)
PLATELET # BLD AUTO: 210 K/UL — SIGNIFICANT CHANGE UP (ref 150–400)
POTASSIUM SERPL-MCNC: 4.5 MMOL/L — SIGNIFICANT CHANGE UP (ref 3.5–5.3)
POTASSIUM SERPL-SCNC: 4.5 MMOL/L — SIGNIFICANT CHANGE UP (ref 3.5–5.3)
RBC # BLD: 5.6 M/UL — SIGNIFICANT CHANGE UP (ref 4.6–6.2)
RBC # FLD: 15.4 % — SIGNIFICANT CHANGE UP (ref 11–15.6)
SODIUM SERPL-SCNC: 140 MMOL/L — SIGNIFICANT CHANGE UP (ref 135–145)
WBC # BLD: 8.6 K/UL — SIGNIFICANT CHANGE UP (ref 4.8–10.8)
WBC # FLD AUTO: 8.6 K/UL — SIGNIFICANT CHANGE UP (ref 4.8–10.8)

## 2018-08-01 PROCEDURE — 80053 COMPREHEN METABOLIC PANEL: CPT

## 2018-08-01 PROCEDURE — 86901 BLOOD TYPING SEROLOGIC RH(D): CPT

## 2018-08-01 PROCEDURE — 36415 COLL VENOUS BLD VENIPUNCTURE: CPT

## 2018-08-01 PROCEDURE — 93458 L HRT ARTERY/VENTRICLE ANGIO: CPT | Mod: XU

## 2018-08-01 PROCEDURE — C1760: CPT

## 2018-08-01 PROCEDURE — C1725: CPT

## 2018-08-01 PROCEDURE — 93010 ELECTROCARDIOGRAM REPORT: CPT

## 2018-08-01 PROCEDURE — C1769: CPT

## 2018-08-01 PROCEDURE — 93005 ELECTROCARDIOGRAM TRACING: CPT

## 2018-08-01 PROCEDURE — G0463: CPT

## 2018-08-01 PROCEDURE — 86900 BLOOD TYPING SEROLOGIC ABO: CPT

## 2018-08-01 PROCEDURE — 86850 RBC ANTIBODY SCREEN: CPT

## 2018-08-01 PROCEDURE — C1894: CPT

## 2018-08-01 PROCEDURE — 80048 BASIC METABOLIC PNL TOTAL CA: CPT

## 2018-08-01 PROCEDURE — 85027 COMPLETE CBC AUTOMATED: CPT

## 2018-08-01 PROCEDURE — 85730 THROMBOPLASTIN TIME PARTIAL: CPT

## 2018-08-01 PROCEDURE — 99152 MOD SED SAME PHYS/QHP 5/>YRS: CPT

## 2018-08-01 PROCEDURE — C9602: CPT

## 2018-08-01 PROCEDURE — C1885: CPT

## 2018-08-01 PROCEDURE — 83735 ASSAY OF MAGNESIUM: CPT

## 2018-08-01 PROCEDURE — 99153 MOD SED SAME PHYS/QHP EA: CPT

## 2018-08-01 PROCEDURE — C1753: CPT

## 2018-08-01 PROCEDURE — C1874: CPT

## 2018-08-01 PROCEDURE — C1887: CPT

## 2018-08-01 PROCEDURE — 92978 ENDOLUMINL IVUS OCT C 1ST: CPT

## 2018-08-01 RX ORDER — ATORVASTATIN CALCIUM 80 MG/1
1 TABLET, FILM COATED ORAL
Qty: 0 | Refills: 0 | COMMUNITY
Start: 2018-08-01

## 2018-08-01 RX ORDER — CLOPIDOGREL BISULFATE 75 MG/1
1 TABLET, FILM COATED ORAL
Qty: 30 | Refills: 5 | OUTPATIENT
Start: 2018-08-01 | End: 2019-01-27

## 2018-08-01 RX ADMIN — Medication 81 MILLIGRAM(S): at 11:44

## 2018-08-01 RX ADMIN — LOSARTAN POTASSIUM 100 MILLIGRAM(S): 100 TABLET, FILM COATED ORAL at 05:01

## 2018-08-01 RX ADMIN — CLOPIDOGREL BISULFATE 75 MILLIGRAM(S): 75 TABLET, FILM COATED ORAL at 11:47

## 2018-08-01 NOTE — DISCHARGE NOTE ADULT - MEDICATION SUMMARY - MEDICATIONS TO STOP TAKING
I will STOP taking the medications listed below when I get home from the hospital:  None Scheduled for minimal excision of pilonidal disease on 5/24/17 with Franklyn Mix MD at [facility].

## 2018-08-01 NOTE — PROGRESS NOTE ADULT - SUBJECTIVE AND OBJECTIVE BOX
SUBJECTIVE:  Cardiology NP F/U note:  SP: [facility] which revealed: DESx1 to mid RCA   denies complaints of chest pain/sob/dizziness/palps overnight   kathy diet OOB       	  MEDICATIONS:  losartan 100 milliGRAM(s) Oral daily  atorvastatin 40 milliGRAM(s) Oral at bedtime  aspirin  chewable 81 milliGRAM(s) Oral daily  clopidogrel Tablet 75 milliGRAM(s) Oral daily        PHYSICAL EXAM:    T(C): 36.8 (18 @ 10:00), Max: 37.1 (18 @ 21:00)  HR: 81 (18 @ 10:00) (48 - 81)  BP: 132/74 (18 @ 10:00) (126/80 - 146/80)  RR: 18 (18 @ 10:00) (17 - 20)  SpO2: 94% (18 @ 04:53) (94% - 97%)  Wt(kg): --    I&O's Summary    2018 07:  -  01 Aug 2018 07:00  --------------------------------------------------------  IN: 0 mL / OUT: 700 mL / NET: -700 mL    01 Aug 2018 07:  -  01 Aug 2018 10:51  --------------------------------------------------------  IN: 0 mL / OUT: 600 mL / NET: -600 mL        Daily     Daily Weight in k (01 Aug 2018 02:39)    Appearance: Normal	  HEENT:   Normal oral mucosa, 	  Lymphatic: No lymphadenopathy  Cardiovascular: Normal S1 S2, RRR 70's No JVD, No murmurs, No edema  Respiratory: Lungs clear to auscultation	  Psychiatry: A & O x 3, Mood & affect appropriate  Gastrointestinal:  Soft, Non-tender, + BS	  Skin: No rashes, No ecchymoses, No cyanosis  Neurologic: Non-focal  Extremities: Normal range of motion, No clubbing, cyanosis or edema Right groin soft / no hematoma dressing removed.   Vascular: Peripheral pulses palpable 2+ bilaterally    TELEMETRY: 	 RSR 70's no events on tele    ECG:  	 RSR 60/ LAD no acute STTW changes.  RADIOLOGY:   DIAGNOSTIC TESTING:  [ ] Echocardiogram:  [X ]  Catheterization:  < from: Cardiac Cath Lab - Adult (18 @ 14:17) >  VENTRICLES: Analysis of regional contractile function demonstrated mild  diaphragmatic hypokinesis and mild posterobasal hypokinesis. Global left  ventricular function was borderline normal. EF estimated was 50 %.  VALVES: MITRAL VALVE: The mitral valve exhibited trivial regurgitation  (less than1+).  CORONARY VESSELS: The coronary circulation is right dominant.  LM:   --  LM: Normal.  LAD:   --  Mid LAD: There was a diffuse 25 % stenosis. The lesion was  irregularly contoured.  CX:   --  Circumflex: Normal.  RCA:   --  Mid RCA: There was a tubular 99 % stenosis at the site of a  prior stent. IVUS showed the previous stent to have been under sized with  deangelo intimal hyperplasia. The MLA after lasering was 3.7 sq mm, indicating  a significant stenosis.  COMPLICATIONS: No complications occurred during the cath lab visit.  SUMMARY:  1ST LESION INTERVENTIONS: A successful drug-eluting stent with laser  atherectomy was performed on the 99 % lesion in the mid RCA. Following  intervention there was an excellent angiographic appearance with a0 %  residual stenosis.    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 The last several weeks has been experiencing stuttering chest pain at rest radiating to left jaw and shoulders. NTG taken on one of the episodes with relief  Saw Dr. New on 2018 and underwent stress ECHO on 2018 with no acute findings.   Anginal class IV equivalent due to chest pain at rest with radiation to left jaw and silvia shoulder.    PMH: CAD/PCI mRCA, obesity, gout, CRI  -SP LHC : with ARIANNA RCA as above  -hemodynamically stable overnight labs ekg reviewed      Plan:  Continue current meds ASA/PLavix/BB/Statin  med compliance out pt follow up and groin care discussed.  stable for d/c home today.   creat at baseline. SUBJECTIVE:  Cardiology NP F/U note:  SP: [facility] which revealed: DESx1 to mid RCA   denies complaints of chest pain/sob/dizziness/palps overnight   kathy diet OOB       	  MEDICATIONS:  losartan 100 milliGRAM(s) Oral daily  atorvastatin 40 milliGRAM(s) Oral at bedtime  aspirin  chewable 81 milliGRAM(s) Oral daily  clopidogrel Tablet 75 milliGRAM(s) Oral daily        PHYSICAL EXAM:    T(C): 36.8 (18 @ 10:00), Max: 37.1 (18 @ 21:00)  HR: 81 (18 @ 10:00) (48 - 81)  BP: 132/74 (18 @ 10:00) (126/80 - 146/80)  RR: 18 (18 @ 10:00) (17 - 20)  SpO2: 94% (18 @ 04:53) (94% - 97%)  Wt(kg): --    I&O's Summary    2018 07:  -  01 Aug 2018 07:00  --------------------------------------------------------  IN: 0 mL / OUT: 700 mL / NET: -700 mL    01 Aug 2018 07:  -  01 Aug 2018 10:51  --------------------------------------------------------  IN: 0 mL / OUT: 600 mL / NET: -600 mL        Daily     Daily Weight in k (01 Aug 2018 02:39)    Appearance: Normal	  HEENT:   Normal oral mucosa, 	  Lymphatic: No lymphadenopathy  Cardiovascular: Normal S1 S2, RRR 70's No JVD, No murmurs, No edema  Respiratory: Lungs clear to auscultation	  Psychiatry: A & O x 3, Mood & affect appropriate  Gastrointestinal:  Soft, Non-tender, + BS	  Skin: No rashes, No ecchymoses, No cyanosis  Neurologic: Non-focal  Extremities: Normal range of motion, No clubbing, cyanosis or edema Right groin soft / no hematoma dressing removed.   Vascular: Peripheral pulses palpable 2+ bilaterally    TELEMETRY: 	 RSR 70's no events on tele    ECG:  	 RSR 60/ LAD no acute STTW changes.  RADIOLOGY:   DIAGNOSTIC TESTING:  [ ] Echocardiogram:  [X ]  Catheterization:  < from: Cardiac Cath Lab - Adult (18 @ 14:17) >  VENTRICLES: Analysis of regional contractile function demonstrated mild  diaphragmatic hypokinesis and mild posterobasal hypokinesis. Global left  ventricular function was borderline normal. EF estimated was 50 %.  VALVES: MITRAL VALVE: The mitral valve exhibited trivial regurgitation  (less than1+).  CORONARY VESSELS: The coronary circulation is right dominant.  LM:   --  LM: Normal.  LAD:   --  Mid LAD: There was a diffuse 25 % stenosis. The lesion was  irregularly contoured.  CX:   --  Circumflex: Normal.  RCA:   --  Mid RCA: There was a tubular 99 % stenosis at the site of a  prior stent. IVUS showed the previous stent to have been under sized with  deangelo intimal hyperplasia. The MLA after lasering was 3.7 sq mm, indicating  a significant stenosis.  COMPLICATIONS: No complications occurred during the cath lab visit.  SUMMARY:  1ST LESION INTERVENTIONS: A successful drug-eluting stent with laser  atherectomy was performed on the 99 % lesion in the mid RCA. Following  intervention there was an excellent angiographic appearance with a0 %  residual stenosis.    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 The last several weeks has been experiencing stuttering chest pain at rest radiating to left jaw and shoulders. NTG taken on one of the episodes with relief  Saw Dr. New on 2018 and underwent stress ECHO on 2018 with no acute findings.   Anginal class IV equivalent due to chest pain at rest with radiation to left jaw and silvia shoulder.    PMH: CAD/PCI mRCA, obesity, gout, CRI  -SP LHC : with ARIANNA RCA as above  -hemodynamically stable overnight labs ekg reviewed      Plan:  Continue current meds ASA/PLavix/ARB/Statin  med compliance out pt follow up and groin care discussed.  stable for d/c home today.   creat at baseline.

## 2018-08-01 NOTE — DISCHARGE NOTE ADULT - MEDICATION SUMMARY - MEDICATIONS TO TAKE
I will START or STAY ON the medications listed below when I get home from the hospital:    Lab Work  -- BMP week of August 5th  CC: Dr. Morales and Dr. Soliz  -- Indication: For labs    indomethacin 75 mg oral capsule, extended release  -- 1 cap(s) by mouth once a day  -- Indication: For pain    aspirin 81 mg oral delayed release tablet  -- 1 tab(s) by mouth once a day  -- Indication: For cad    losartan 100 mg oral tablet  -- 1 tab(s) by mouth once a day  -- Indication: For HTN    atorvastatin 40 mg oral tablet  -- 1 tab(s) by mouth once a day (at bedtime)  -- Indication: For cad    clopidogrel 75 mg oral tablet  -- 1 tab(s) by mouth once a day  -- Indication: For cad with stents

## 2018-08-01 NOTE — DISCHARGE NOTE ADULT - ABILITY TO HEAR (WITH HEARING AID OR HEARING APPLIANCE IF NORMALLY USED):
Adequate: hears normal conversation without difficulty Airway patent, Nasal mucosa clear. Mouth with normal mucosa.

## 2018-08-01 NOTE — DISCHARGE NOTE ADULT - HOSPITAL COURSE
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 The last several weeks has been experiencing stuttering chest pain at rest radiating to left jaw and shoulders. NTG taken on one of the episodes with relief  Saw Dr. New on July 6 2018 and underwent stress ECHO on July 26 2018 with no acute findings.  Imdur 30 was prescribed at that time but pt did not fill and take as he states he had NTG if he needed it  Anginal class IV equivalent due to chest pain at rest with radiation to left jaw and silvia shoulder.    PMH: CAD/PCI mRCA, obesity, gout, CRI  SP [facility] 7/31/18: DESx1 to RCA  tolerated well   -hemodynamically stable overnight labs ekg reviewed  Plan:  Continue current meds ASA/PLavix//Statin/ ARB  med compliance out pt follow up and groin care discussed.  stable for d/c home today.   creat at baseline.

## 2018-08-01 NOTE — DISCHARGE NOTE ADULT - PLAN OF CARE
return to pre hosp functionl status without chest pain No heavy lifting, driving, sex, tub baths, swimming, or any activity that submerges the lower half of the body in water for 48 hours.  Limited walking and stairs for 48 hours.    Change the bandaid after 24 hours and every 24 hours after that.  Keep the puncture site dry and covered with a bandaid until a scab forms.    Observe the site frequently.  If bleeding or a large lump (the size of a golf ball or bigger) occurs lie flat, apply continuous direct pressure just above the puncture site for at least 10 minutes, and notify your physician immediately.  If the bleeding cannot be controlled, call 911 immediately for assistance.  Notify your physician of pain, swelling or any drainage.    Notify your physician immediately if coldness, numbness, discoloration or pain in your foot occurs. follow up with your primary MD within one week.  Return to the Emergency dept. for any chest pain or shortness of breath  Do not stop Aspirin or Plavix without speaking with cardiologist first follow up with Dr. New / Dr. Morales in 7-10 days.

## 2018-08-01 NOTE — DISCHARGE NOTE ADULT - CARE PLAN
Principal Discharge DX:	Coronary artery disease with unstable angina pectoris, unspecified vessel or lesion type, unspecified whether native or transplanted heart  Goal:	return to pre hosp functionl status without chest pain  Assessment and plan of treatment:	No heavy lifting, driving, sex, tub baths, swimming, or any activity that submerges the lower half of the body in water for 48 hours.  Limited walking and stairs for 48 hours.    Change the bandaid after 24 hours and every 24 hours after that.  Keep the puncture site dry and covered with a bandaid until a scab forms.    Observe the site frequently.  If bleeding or a large lump (the size of a golf ball or bigger) occurs lie flat, apply continuous direct pressure just above the puncture site for at least 10 minutes, and notify your physician immediately.  If the bleeding cannot be controlled, call 911 immediately for assistance.  Notify your physician of pain, swelling or any drainage.    Notify your physician immediately if coldness, numbness, discoloration or pain in your foot occurs.  Assessment and plan of treatment:	follow up with your primary MD within one week.  Return to the Emergency dept. for any chest pain or shortness of breath  Do not stop Aspirin or Plavix without speaking with cardiologist first  Assessment and plan of treatment:	follow up with Dr. New / Dr. Morales in 7-10 days.

## 2018-08-01 NOTE — DISCHARGE NOTE ADULT - CARE PROVIDER_API CALL
German New), Cardiac Electrophysiology; Cardiovascular Disease  [address] [place] [address]  Phone: [phone]  Fax: [phone]

## 2018-08-01 NOTE — DISCHARGE NOTE ADULT - PATIENT PORTAL LINK FT
You can access the [facility] Patient Portal, offered by [facility], by registering with the following website: [email or web address]

## 2019-10-04 NOTE — PATIENT PROFILE ADULT. - NSCAFFEINETYPE_GEN_ALL_CORE_SD
DATE OF PROCEDURE:  03/02/2017    SURGEON:  Byron Barajas M.D.    PROCEDURES PERFORMED:  Esophagogastroduodenoscopy and Barrx ablation of Vasquez  esophagus.    PREOPERATIVE DIAGNOSES:  1. Long segment Vasquez esophagus.  2. History of Barrx radiofrequency ablation of Vasquez esophagus.    POSTOPERATIVE DIAGNOSES:  1. Vasquez esophagus-long segment.  2. Radiofrequency ablation of the distal portion of the Vasquez esophagus.  3. Hiatal hernia.    HISTORY:  The patient is a 66-year-old male with a history of long segment  Vasquez esophagus.  He has undergone previous radiofrequency ablations.  First  of that in one-half of the Vasquez esophagus, and the next session in the other  portion of the esophagus.  He is on Entyvio for small bowel Crohn disease  causing iron deficiency anemia despite a negative Prometheus assay.  He is not  taking aspirin or nonsteroidal inflammatory drugs.    FINDINGS:  An Olympus video endoscope was advanced with the patient in left  lateral position.  There was a long segment of Vasquez esophagus beginning at 38  cm from the incisors all the way up to the cervical esophagus at 23 cm from the  incisors.  This gives a total length of 15 cm of Vasquez mucosa.  There were  some islands of squamous mucosa from previous treatment.  There was a moderately  large sliding hiatal hernia.  The guidewire was advanced into the duodenum and  radiofrequency ablation was performed in the distal esophagus for 3 portions of  the distal esophagus for a total length of 8 cm.  Six ablations were done at 10  joules each.  Three ablations were first done, then scraping, and then the last  3 ablations.    SUGGEST:  1. Proton pump inhibitor, such as omeprazole 40 mg b.i.d. before breakfast and  dinner.  2. Elevation of the head of the bed.  3. Encouraged laparoscopic fundoplication prior to next radiofrequency  ablation.  4. The patient will continue Entyvio for his small bowel Crohn  disease.        DATE AND TIME                       SARAH Rosa/MEDQ-#[id number]  DD:  03/02/2017 18:34:28      DT:  03/03/2017 00:58:33    cc:  Byron Barajas M.D.        [facility]    REPORT OF OPERATION        CLAIRE NIXON  MRN#: [id number]  ROOM:  [facility]#: [id number] Reports         Electronically Signed On 03/03/2017 09:53  __________________________________________________   BYRON HELTON     coffee

## 2019-11-01 ENCOUNTER — EMERGENCY (EMERGENCY)
Facility: HOSPITAL | Age: 69
LOS: 0 days | Discharge: HOME | End: 2019-11-01
Attending: EMERGENCY MEDICINE
Payer: MEDICARE

## 2019-11-01 VITALS
OXYGEN SATURATION: 97 % | HEART RATE: 60 BPM | DIASTOLIC BLOOD PRESSURE: 86 MMHG | RESPIRATION RATE: 18 BRPM | SYSTOLIC BLOOD PRESSURE: 174 MMHG | TEMPERATURE: 98 F

## 2019-11-01 VITALS — WEIGHT: 179.9 LBS | HEIGHT: 66 IN

## 2019-11-01 DIAGNOSIS — Z87.81 PERSONAL HISTORY OF (HEALED) TRAUMATIC FRACTURE: Chronic | ICD-10-CM

## 2019-11-01 DIAGNOSIS — M10.071 IDIOPATHIC GOUT, RIGHT ANKLE AND FOOT: ICD-10-CM

## 2019-11-01 DIAGNOSIS — M79.661 PAIN IN RIGHT LOWER LEG: ICD-10-CM

## 2019-11-01 DIAGNOSIS — S69.90XA UNSPECIFIED INJURY OF UNSPECIFIED WRIST, HAND AND FINGER(S), INITIAL ENCOUNTER: Chronic | ICD-10-CM

## 2019-11-01 DIAGNOSIS — R60.0 LOCALIZED EDEMA: ICD-10-CM

## 2019-11-01 LAB
ALBUMIN SERPL ELPH-MCNC: 3.4 G/DL — SIGNIFICANT CHANGE UP (ref 3.3–5)
ALP SERPL-CCNC: 94 U/L — SIGNIFICANT CHANGE UP (ref 40–120)
ALT FLD-CCNC: 40 U/L — SIGNIFICANT CHANGE UP (ref 12–78)
ANION GAP SERPL CALC-SCNC: 7 MMOL/L — SIGNIFICANT CHANGE UP (ref 5–17)
APTT BLD: 33.3 SEC — SIGNIFICANT CHANGE UP (ref 27.5–36.3)
AST SERPL-CCNC: 21 U/L — SIGNIFICANT CHANGE UP (ref 15–37)
BASOPHILS # BLD AUTO: 0.05 K/UL — SIGNIFICANT CHANGE UP (ref 0–0.2)
BASOPHILS NFR BLD AUTO: 0.6 % — SIGNIFICANT CHANGE UP (ref 0–2)
BILIRUB SERPL-MCNC: 0.4 MG/DL — SIGNIFICANT CHANGE UP (ref 0.2–1.2)
BUN SERPL-MCNC: 35 MG/DL — HIGH (ref 7–23)
CALCIUM SERPL-MCNC: 8.9 MG/DL — SIGNIFICANT CHANGE UP (ref 8.5–10.1)
CHLORIDE SERPL-SCNC: 106 MMOL/L — SIGNIFICANT CHANGE UP (ref 96–108)
CO2 SERPL-SCNC: 28 MMOL/L — SIGNIFICANT CHANGE UP (ref 22–31)
CREAT SERPL-MCNC: 1.96 MG/DL — HIGH (ref 0.5–1.3)
EOSINOPHIL # BLD AUTO: 0.31 K/UL — SIGNIFICANT CHANGE UP (ref 0–0.5)
EOSINOPHIL NFR BLD AUTO: 3.6 % — SIGNIFICANT CHANGE UP (ref 0–6)
ERYTHROCYTE [SEDIMENTATION RATE] IN BLOOD: 22 MM/HR — HIGH (ref 0–20)
GLUCOSE SERPL-MCNC: 97 MG/DL — SIGNIFICANT CHANGE UP (ref 70–99)
HCT VFR BLD CALC: 46.7 % — SIGNIFICANT CHANGE UP (ref 39–50)
HGB BLD-MCNC: 14.5 G/DL — SIGNIFICANT CHANGE UP (ref 13–17)
IMM GRANULOCYTES NFR BLD AUTO: 0.9 % — SIGNIFICANT CHANGE UP (ref 0–1.5)
INR BLD: 1.07 RATIO — SIGNIFICANT CHANGE UP (ref 0.88–1.16)
LIDOCAIN IGE QN: 283 U/L — SIGNIFICANT CHANGE UP (ref 73–393)
LYMPHOCYTES # BLD AUTO: 1.49 K/UL — SIGNIFICANT CHANGE UP (ref 1–3.3)
LYMPHOCYTES # BLD AUTO: 17.1 % — SIGNIFICANT CHANGE UP (ref 13–44)
MAGNESIUM SERPL-MCNC: 2.6 MG/DL — SIGNIFICANT CHANGE UP (ref 1.6–2.6)
MCHC RBC-ENTMCNC: 27.8 PG — SIGNIFICANT CHANGE UP (ref 27–34)
MCHC RBC-ENTMCNC: 31 GM/DL — LOW (ref 32–36)
MCV RBC AUTO: 89.5 FL — SIGNIFICANT CHANGE UP (ref 80–100)
MONOCYTES # BLD AUTO: 0.85 K/UL — SIGNIFICANT CHANGE UP (ref 0–0.9)
MONOCYTES NFR BLD AUTO: 9.7 % — SIGNIFICANT CHANGE UP (ref 2–14)
NEUTROPHILS # BLD AUTO: 5.95 K/UL — SIGNIFICANT CHANGE UP (ref 1.8–7.4)
NEUTROPHILS NFR BLD AUTO: 68.1 % — SIGNIFICANT CHANGE UP (ref 43–77)
NT-PROBNP SERPL-SCNC: 238 PG/ML — HIGH (ref 0–125)
PLATELET # BLD AUTO: 306 K/UL — SIGNIFICANT CHANGE UP (ref 150–400)
POTASSIUM SERPL-MCNC: 4.1 MMOL/L — SIGNIFICANT CHANGE UP (ref 3.5–5.3)
POTASSIUM SERPL-SCNC: 4.1 MMOL/L — SIGNIFICANT CHANGE UP (ref 3.5–5.3)
PROT SERPL-MCNC: 7.5 GM/DL — SIGNIFICANT CHANGE UP (ref 6–8.3)
PROTHROM AB SERPL-ACNC: 11.9 SEC — SIGNIFICANT CHANGE UP (ref 10–12.9)
RBC # BLD: 5.22 M/UL — SIGNIFICANT CHANGE UP (ref 4.2–5.8)
RBC # FLD: 14.2 % — SIGNIFICANT CHANGE UP (ref 10.3–14.5)
SODIUM SERPL-SCNC: 141 MMOL/L — SIGNIFICANT CHANGE UP (ref 135–145)
TROPONIN I SERPL-MCNC: <0.015 NG/ML — SIGNIFICANT CHANGE UP (ref 0.01–0.04)
WBC # BLD: 8.73 K/UL — SIGNIFICANT CHANGE UP (ref 3.8–10.5)
WBC # FLD AUTO: 8.73 K/UL — SIGNIFICANT CHANGE UP (ref 3.8–10.5)

## 2019-11-01 PROCEDURE — 85730 THROMBOPLASTIN TIME PARTIAL: CPT

## 2019-11-01 PROCEDURE — 73630 X-RAY EXAM OF FOOT: CPT | Mod: LT

## 2019-11-01 PROCEDURE — 99284 EMERGENCY DEPT VISIT MOD MDM: CPT

## 2019-11-01 PROCEDURE — 73630 X-RAY EXAM OF FOOT: CPT | Mod: 26,LT

## 2019-11-01 PROCEDURE — 73610 X-RAY EXAM OF ANKLE: CPT | Mod: LT

## 2019-11-01 PROCEDURE — 96374 THER/PROPH/DIAG INJ IV PUSH: CPT

## 2019-11-01 PROCEDURE — 36415 COLL VENOUS BLD VENIPUNCTURE: CPT

## 2019-11-01 PROCEDURE — 93970 EXTREMITY STUDY: CPT | Mod: 26

## 2019-11-01 PROCEDURE — 84484 ASSAY OF TROPONIN QUANT: CPT

## 2019-11-01 PROCEDURE — 83880 ASSAY OF NATRIURETIC PEPTIDE: CPT

## 2019-11-01 PROCEDURE — 85025 COMPLETE CBC W/AUTO DIFF WBC: CPT

## 2019-11-01 PROCEDURE — 85610 PROTHROMBIN TIME: CPT

## 2019-11-01 PROCEDURE — 80053 COMPREHEN METABOLIC PANEL: CPT

## 2019-11-01 PROCEDURE — 93970 EXTREMITY STUDY: CPT

## 2019-11-01 PROCEDURE — 83690 ASSAY OF LIPASE: CPT

## 2019-11-01 PROCEDURE — 73610 X-RAY EXAM OF ANKLE: CPT | Mod: 26,LT

## 2019-11-01 PROCEDURE — 71046 X-RAY EXAM CHEST 2 VIEWS: CPT

## 2019-11-01 PROCEDURE — 99284 EMERGENCY DEPT VISIT MOD MDM: CPT | Mod: 25

## 2019-11-01 PROCEDURE — 85652 RBC SED RATE AUTOMATED: CPT

## 2019-11-01 PROCEDURE — 83735 ASSAY OF MAGNESIUM: CPT

## 2019-11-01 PROCEDURE — 71046 X-RAY EXAM CHEST 2 VIEWS: CPT | Mod: 26

## 2019-11-01 RX ORDER — KETOROLAC TROMETHAMINE 30 MG/ML
15 SYRINGE (ML) INJECTION ONCE
Refills: 0 | Status: DISCONTINUED | OUTPATIENT
Start: 2019-11-01 | End: 2019-11-01

## 2019-11-01 RX ORDER — OXYCODONE HYDROCHLORIDE 5 MG/1
1 TABLET ORAL
Qty: 12 | Refills: 0
Start: 2019-11-01

## 2019-11-01 RX ORDER — ACETAMINOPHEN 500 MG
975 TABLET ORAL ONCE
Refills: 0 | Status: COMPLETED | OUTPATIENT
Start: 2019-11-01 | End: 2019-11-01

## 2019-11-01 RX ADMIN — Medication 975 MILLIGRAM(S): at 21:50

## 2019-11-01 RX ADMIN — Medication 50 MILLIGRAM(S): at 21:51

## 2019-11-01 RX ADMIN — Medication 15 MILLIGRAM(S): at 21:50

## 2019-11-01 NOTE — ED STATDOCS - ATTENDING CONTRIBUTION TO CARE
I, Jesus Ashley MD,  performed the initial face to face bedside interview with this patient regarding history of present illness, review of symptoms and relevant past medical, social and family history.  I completed an independent physical examination.  I was the initial provider who evaluated this patient. I have signed out the follow up of any pending tests (i.e. labs, radiological studies) to the ACP.  I have communicated the patient’s plan of care and disposition with the ACP.

## 2019-11-01 NOTE — ED STATDOCS - PROGRESS NOTE DETAILS
Patient seen and evaluated s/p workup.  Some erythema, warmth and swelling consistent with gout seen on R ankle.  Labs and xray negative. Patient aware his creatinine is elevated here today and he should d/c indomethacin.  A copy of results provided for patient to follow up with PMD for creatinine management.  Will treat with prednisone and oxycodone for pain.  He will follow up with podiatry as well -Nori Wood PA-C

## 2019-11-01 NOTE — ED STATDOCS - CARE PROVIDER_API CALL
Melissa Donaldson (DPM)  Podiatric Medicine and Surgery  [address], [address]  [place] [address]  Phone: [phone]  Fax: [phone]  Follow Up Time:

## 2019-11-01 NOTE — ED ADULT TRIAGE NOTE - CHIEF COMPLAINT QUOTE
patient presents to ed complaining of L swollen ankle, patient denies fevers / trauma to the site patient presents to ed complaining of L ankle pain, ankle is swollen patient denies fevers / trauma to the site.

## 2019-11-01 NOTE — ED STATDOCS - PHYSICAL EXAMINATION
*GEN: No acute distress, well appearing; A+O x3   *HEAD: Normocephalic, Atraumatic  *EYES/NOSE: PERRL & EOMI b/l  *THROAT: airway patent, moist mucous membranes  *NECK: Neck supple, no masses  *PULMONARY: CTA b/l, symmetric breath sounds.   *CARDIAC: s1s2, regular rhythm, no Murmur  *ABDOMEN:  Non Tender, Non Distended, soft, no guarding, no rebound, no masses   *BACK: no CVA tenderness, Normal  spine   *EXTREMITIES: +left ankle swelling mild erythema and calor, full active passive rom b/l LE pitting edema.  *SKIN: no rash or bruising   *NEUROLOGIC: alert, CN 2-12 intact, moves all 4 extremities, full active & passive ROM in all extremities, normal baseline gait  *PSYCH: insight and judgment nl, memory nl, affect nl, thought nl *GEN: No acute distress, well appearing; A+O x3   *HEAD: Normocephalic, Atraumatic  *EYES/NOSE: PERRL & EOMI b/l  *THROAT: airway patent, moist mucous membranes  *NECK: Neck supple, no masses  *PULMONARY: CTA b/l, symmetric breath sounds.   *CARDIAC: s1s2, regular rhythm, no Murmur  *ABDOMEN:  Non Tender, Non Distended, soft, no guarding, no rebound, no masses   *BACK: no CVA tenderness, Normal  spine   *EXTREMITIES: +left ankle swelling mild erythema and calor, full active & passive rom; b/l LE pitting edema.  *SKIN: no rash or bruising   *NEUROLOGIC: alert, CN 2-12 intact, moves all 4 extremities, full active & passive ROM in all extremities, normal baseline gait  *PSYCH: insight and judgment nl, memory nl, affect nl, thought nl

## 2019-11-01 NOTE — ED STATDOCS - FAMILY HISTORY
Father  Still living? Unknown  Family history of CABG, Age at diagnosis: Age Unknown  Family history of diabetes mellitus, Age at diagnosis: Age Unknown  Family history of early CAD, Age at diagnosis: Age Unknown

## 2019-11-01 NOTE — ED ADULT NURSE NOTE - CHIEF COMPLAINT QUOTE
patient presents to ed complaining of L ankle pain, ankle is swollen patient denies fevers / trauma to the site.

## 2019-11-01 NOTE — ED STATDOCS - PATIENT PORTAL LINK FT
You can access the FollowMyHealth Patient Portal offered by [facility] by registering at the following website: [email or web address]. By joining [employer]’s FollowMyHealth portal, you will also be able to view your health information using other applications (apps) compatible with our system.

## 2019-11-01 NOTE — ED ADULT NURSE NOTE - OBJECTIVE STATEMENT
c/o left ankle pain with swelling for 1 week. Took Motrin last night with little relief. Denies fever/chills

## 2019-11-01 NOTE — ED STATDOCS - CLINICAL SUMMARY MEDICAL DECISION MAKING FREE TEXT BOX
sx likely due to left ankle gout, pt recently had gout episode in right 1st metatarsal joint, however new b/l LE pitting edema noted, hx of CAD and cardiac stents, will evaluate for CHF vs DVT. sx likely due to left ankle gout, pt recently had gout episode in right 1st metatarsal joint. joint doesn't look septic. however since b/l LE pitting edema noted, hx of CAD and cardiac stents, will evaluate for CHF vs DVT.

## 2019-11-01 NOTE — ED STATDOCS - PMH
Abnormal stress echo    Angina pectoris  class IV  CAD (coronary artery disease)    CRI (chronic renal insufficiency)    Dizziness    THOMAS (dyspnea on exertion)    Eye abnormality  left eye skin overgrowth  Former smoker    Gout    HTN (hypertension)    Hypertension    Morbid obesity    Right elbow pain  cyst on elbow  Right hand fracture    Stented coronary artery  mRCA 3.0 x 24 SYNERGY April 2017

## 2019-11-01 NOTE — ED STATDOCS - CARE PLAN
Principal Discharge DX:	Gout flare Principal Discharge DX:	Gout flare  Secondary Diagnosis:	Lower extremity edema

## 2019-11-01 NOTE — ED STATDOCS - NS ED ROS FT
Review of Systems:  	•	CONSTITUTIONAL: no fever  	•	SKIN: no rash  	•	RESPIRATORY: no shortness of breath  	•	CARDIAC: no chest pain, no palpitations  	•	GI:  no abd pain, no nausea, no vomiting, no diarrhea  	•	GENITO-URINARY:  no dysuria; no hematuria    	•	MUSCULOSKELETAL:  no back pain, +left ankle swelling and pain  	•	NEUROLOGIC: no weakness  	•	ALLERGY: no rhinitis  	•	PSYSCHIATRIC: no anxiety Review of Systems:  	•	CONSTITUTIONAL: no fever  	•	SKIN: no rash  	•	RESPIRATORY: no shortness of breath  	•	CARDIAC: no chest pain, no palpitations; B/l LE pitting edema  	•	GI:  no abd pain, no nausea, no vomiting, no diarrhea  	•	GENITO-URINARY:  no dysuria; no hematuria    	•	MUSCULOSKELETAL:  no back pain, +left ankle swelling and pain  	•	NEUROLOGIC: no weakness  	•	ALLERGY: no rhinitis  	•	PSYSCHIATRIC: no anxiety

## 2019-11-01 NOTE — ED STATDOCS - OBJECTIVE STATEMENT
Pertinent HPI/PMH/PSH/FHx/SHx and Review of Systems contained within  HPI: 67 yo male p/w CC worsening swelling and pain to left medial ankle x 1.5 week. States pain is at a different location to where his gout usually occurs. no fall or injury. Had recent gout episode to his right foot and states episode lasts for 3-5 days. Took 1 Motrin yesterday with minimal relief.   PMH/PSH relevant for: CAD, HTN, GOut  ROS negative for: fever, Chest pain, SOB, Nausea, vomiting.  FamilyHx and SocialHx not otherwise contributory Pertinent HPI/PMH/PSH/FHx/SHx and Review of Systems contained within  HPI: 67 yo M p/w CC worsening swelling and pain to left medial ankle x 1.5 week. ankle is red & swollen & looks like his gout. but States pain is at a different location to where his gout usually occurs & is not getting better with motrin & indomethacin. no fall or injury. Had recent gout episode to his right 1st MTS and states episode lasts for 3-5 days. on ROS also noted new onset b/l LE pitting edema  PMH/PSH relevant for: CAD s/p PCI, HTN, HLD GOut  ROS negative for: fever, Chest pain, SOB, Nausea, vomiting.  FamilyHx and SocialHx not otherwise contributory

## 2019-11-02 PROBLEM — S62.91XA UNSPECIFIED FRACTURE OF RIGHT WRIST AND HAND, INITIAL ENCOUNTER FOR CLOSED FRACTURE: Chronic | Status: ACTIVE | Noted: 2018-07-30

## 2019-11-02 PROBLEM — Q15.9 CONGENITAL MALFORMATION OF EYE, UNSPECIFIED: Chronic | Status: ACTIVE | Noted: 2018-07-30

## 2019-11-02 PROBLEM — Z95.5 PRESENCE OF CORONARY ANGIOPLASTY IMPLANT AND GRAFT: Chronic | Status: ACTIVE | Noted: 2018-07-30

## 2019-11-02 PROBLEM — I25.10 ATHEROSCLEROTIC HEART DISEASE OF NATIVE CORONARY ARTERY WITHOUT ANGINA PECTORIS: Chronic | Status: ACTIVE | Noted: 2018-07-30

## 2019-11-02 PROBLEM — R06.09 OTHER FORMS OF DYSPNEA: Chronic | Status: ACTIVE | Noted: 2018-07-30

## 2019-11-02 PROBLEM — R94.39 ABNORMAL RESULT OF OTHER CARDIOVASCULAR FUNCTION STUDY: Chronic | Status: ACTIVE | Noted: 2018-07-30

## 2019-11-02 PROBLEM — Z87.891 PERSONAL HISTORY OF NICOTINE DEPENDENCE: Chronic | Status: ACTIVE | Noted: 2018-07-30

## 2019-11-02 PROBLEM — E66.01 MORBID (SEVERE) OBESITY DUE TO EXCESS CALORIES: Chronic | Status: ACTIVE | Noted: 2018-07-30

## 2019-11-02 PROBLEM — I10 ESSENTIAL (PRIMARY) HYPERTENSION: Chronic | Status: ACTIVE | Noted: 2018-07-30

## 2019-11-02 PROBLEM — I20.9 ANGINA PECTORIS, UNSPECIFIED: Chronic | Status: ACTIVE | Noted: 2018-07-30

## 2019-11-02 PROBLEM — N18.9 CHRONIC KIDNEY DISEASE, UNSPECIFIED: Chronic | Status: ACTIVE | Noted: 2018-07-30

## 2019-11-02 PROBLEM — R42 DIZZINESS AND GIDDINESS: Chronic | Status: ACTIVE | Noted: 2018-07-30

## 2019-11-02 PROBLEM — M10.9 GOUT, UNSPECIFIED: Chronic | Status: ACTIVE | Noted: 2018-07-30

## 2019-11-02 PROBLEM — M25.521 PAIN IN RIGHT ELBOW: Chronic | Status: ACTIVE | Noted: 2018-07-30

## 2020-01-16 NOTE — ED ADULT NURSE NOTE - FINAL NURSING ELECTRONIC SIGNATURE
Spoke to patient and informed that  prefers to do labs after visit. He is agreeable and will fast as his appointment is at 8 am with Dr. Monzon. Did review fasting details with patient and that water is ok. No further questions.    01-Nov-2019 23:30

## 2020-02-03 ENCOUNTER — EMERGENCY (EMERGENCY)
Facility: HOSPITAL | Age: 70
LOS: 0 days | Discharge: HOME | End: 2020-02-03
Attending: EMERGENCY MEDICINE
Payer: MEDICARE

## 2020-02-03 VITALS
RESPIRATION RATE: 16 BRPM | SYSTOLIC BLOOD PRESSURE: 140 MMHG | HEART RATE: 78 BPM | TEMPERATURE: 99 F | HEIGHT: 63 IN | WEIGHT: 181 LBS | OXYGEN SATURATION: 97 % | DIASTOLIC BLOOD PRESSURE: 86 MMHG

## 2020-02-03 VITALS
SYSTOLIC BLOOD PRESSURE: 133 MMHG | HEART RATE: 70 BPM | DIASTOLIC BLOOD PRESSURE: 71 MMHG | OXYGEN SATURATION: 99 % | RESPIRATION RATE: 16 BRPM | TEMPERATURE: 98 F

## 2020-02-03 DIAGNOSIS — R55 SYNCOPE AND COLLAPSE: ICD-10-CM

## 2020-02-03 DIAGNOSIS — I12.9 HYPERTENSIVE CHRONIC KIDNEY DISEASE WITH STAGE 1 THROUGH STAGE 4 CHRONIC KIDNEY DISEASE, OR UNSPECIFIED CHRONIC KIDNEY DISEASE: ICD-10-CM

## 2020-02-03 DIAGNOSIS — M10.9 GOUT, UNSPECIFIED: ICD-10-CM

## 2020-02-03 DIAGNOSIS — Z79.82 LONG TERM (CURRENT) USE OF ASPIRIN: ICD-10-CM

## 2020-02-03 DIAGNOSIS — Z87.891 PERSONAL HISTORY OF NICOTINE DEPENDENCE: ICD-10-CM

## 2020-02-03 DIAGNOSIS — S69.90XA UNSPECIFIED INJURY OF UNSPECIFIED WRIST, HAND AND FINGER(S), INITIAL ENCOUNTER: Chronic | ICD-10-CM

## 2020-02-03 DIAGNOSIS — Z95.5 PRESENCE OF CORONARY ANGIOPLASTY IMPLANT AND GRAFT: ICD-10-CM

## 2020-02-03 DIAGNOSIS — N18.9 CHRONIC KIDNEY DISEASE, UNSPECIFIED: ICD-10-CM

## 2020-02-03 DIAGNOSIS — Z79.02 LONG TERM (CURRENT) USE OF ANTITHROMBOTICS/ANTIPLATELETS: ICD-10-CM

## 2020-02-03 DIAGNOSIS — I25.810 ATHEROSCLEROSIS OF CORONARY ARTERY BYPASS GRAFT(S) WITHOUT ANGINA PECTORIS: ICD-10-CM

## 2020-02-03 DIAGNOSIS — Z87.81 PERSONAL HISTORY OF (HEALED) TRAUMATIC FRACTURE: Chronic | ICD-10-CM

## 2020-02-03 LAB
ADD ON TEST-SPECIMEN IN LAB: SIGNIFICANT CHANGE UP
ALBUMIN SERPL ELPH-MCNC: 3.2 G/DL — LOW (ref 3.3–5)
ALP SERPL-CCNC: 80 U/L — SIGNIFICANT CHANGE UP (ref 40–120)
ALT FLD-CCNC: 27 U/L — SIGNIFICANT CHANGE UP (ref 12–78)
ANION GAP SERPL CALC-SCNC: 8 MMOL/L — SIGNIFICANT CHANGE UP (ref 5–17)
APPEARANCE UR: CLEAR — SIGNIFICANT CHANGE UP
APTT BLD: 28.2 SEC — SIGNIFICANT CHANGE UP (ref 27.5–36.3)
AST SERPL-CCNC: 13 U/L — LOW (ref 15–37)
BACTERIA # UR AUTO: NEGATIVE — SIGNIFICANT CHANGE UP
BILIRUB SERPL-MCNC: 1 MG/DL — SIGNIFICANT CHANGE UP (ref 0.2–1.2)
BILIRUB UR-MCNC: NEGATIVE — SIGNIFICANT CHANGE UP
BUN SERPL-MCNC: 23 MG/DL — SIGNIFICANT CHANGE UP (ref 7–23)
CALCIUM SERPL-MCNC: 8.9 MG/DL — SIGNIFICANT CHANGE UP (ref 8.5–10.1)
CHLORIDE SERPL-SCNC: 108 MMOL/L — SIGNIFICANT CHANGE UP (ref 96–108)
CO2 SERPL-SCNC: 22 MMOL/L — SIGNIFICANT CHANGE UP (ref 22–31)
COLOR SPEC: YELLOW — SIGNIFICANT CHANGE UP
COMMENT - URINE: SIGNIFICANT CHANGE UP
CREAT SERPL-MCNC: 1.49 MG/DL — HIGH (ref 0.5–1.3)
DIFF PNL FLD: NEGATIVE — SIGNIFICANT CHANGE UP
EPI CELLS # UR: NEGATIVE — SIGNIFICANT CHANGE UP
GLUCOSE SERPL-MCNC: 106 MG/DL — HIGH (ref 70–99)
GLUCOSE UR QL: NEGATIVE MG/DL — SIGNIFICANT CHANGE UP
HCT VFR BLD CALC: 49.4 % — SIGNIFICANT CHANGE UP (ref 39–50)
HGB BLD-MCNC: 15.4 G/DL — SIGNIFICANT CHANGE UP (ref 13–17)
INR BLD: 1.15 RATIO — SIGNIFICANT CHANGE UP (ref 0.88–1.16)
KETONES UR-MCNC: NEGATIVE — SIGNIFICANT CHANGE UP
LEUKOCYTE ESTERASE UR-ACNC: NEGATIVE — SIGNIFICANT CHANGE UP
MCHC RBC-ENTMCNC: 27.5 PG — SIGNIFICANT CHANGE UP (ref 27–34)
MCHC RBC-ENTMCNC: 31.2 GM/DL — LOW (ref 32–36)
MCV RBC AUTO: 88.4 FL — SIGNIFICANT CHANGE UP (ref 80–100)
NITRITE UR-MCNC: NEGATIVE — SIGNIFICANT CHANGE UP
NT-PROBNP SERPL-SCNC: 355 PG/ML — HIGH (ref 0–125)
PH UR: 7 — SIGNIFICANT CHANGE UP (ref 5–8)
PLATELET # BLD AUTO: 228 K/UL — SIGNIFICANT CHANGE UP (ref 150–400)
POTASSIUM SERPL-MCNC: 4 MMOL/L — SIGNIFICANT CHANGE UP (ref 3.5–5.3)
POTASSIUM SERPL-SCNC: 4 MMOL/L — SIGNIFICANT CHANGE UP (ref 3.5–5.3)
PROT SERPL-MCNC: 6.8 GM/DL — SIGNIFICANT CHANGE UP (ref 6–8.3)
PROT UR-MCNC: 15 MG/DL
PROTHROM AB SERPL-ACNC: 12.8 SEC — SIGNIFICANT CHANGE UP (ref 10–12.9)
RBC # BLD: 5.59 M/UL — SIGNIFICANT CHANGE UP (ref 4.2–5.8)
RBC # FLD: 17.3 % — HIGH (ref 10.3–14.5)
RBC CASTS # UR COMP ASSIST: SIGNIFICANT CHANGE UP /HPF (ref 0–4)
SODIUM SERPL-SCNC: 138 MMOL/L — SIGNIFICANT CHANGE UP (ref 135–145)
SP GR SPEC: 1 — LOW (ref 1.01–1.02)
TROPONIN I SERPL-MCNC: <0.015 NG/ML — SIGNIFICANT CHANGE UP (ref 0.01–0.04)
UROBILINOGEN FLD QL: NEGATIVE MG/DL — SIGNIFICANT CHANGE UP
WBC # BLD: 14.79 K/UL — HIGH (ref 3.8–10.5)
WBC # FLD AUTO: 14.79 K/UL — HIGH (ref 3.8–10.5)
WBC UR QL: SIGNIFICANT CHANGE UP

## 2020-02-03 PROCEDURE — 85027 COMPLETE CBC AUTOMATED: CPT

## 2020-02-03 PROCEDURE — 80053 COMPREHEN METABOLIC PANEL: CPT

## 2020-02-03 PROCEDURE — 85610 PROTHROMBIN TIME: CPT

## 2020-02-03 PROCEDURE — 85730 THROMBOPLASTIN TIME PARTIAL: CPT

## 2020-02-03 PROCEDURE — 99284 EMERGENCY DEPT VISIT MOD MDM: CPT | Mod: 25

## 2020-02-03 PROCEDURE — 36415 COLL VENOUS BLD VENIPUNCTURE: CPT

## 2020-02-03 PROCEDURE — 81001 URINALYSIS AUTO W/SCOPE: CPT

## 2020-02-03 PROCEDURE — 71045 X-RAY EXAM CHEST 1 VIEW: CPT | Mod: 26

## 2020-02-03 PROCEDURE — 83880 ASSAY OF NATRIURETIC PEPTIDE: CPT

## 2020-02-03 PROCEDURE — 93005 ELECTROCARDIOGRAM TRACING: CPT

## 2020-02-03 PROCEDURE — 99284 EMERGENCY DEPT VISIT MOD MDM: CPT

## 2020-02-03 PROCEDURE — 93010 ELECTROCARDIOGRAM REPORT: CPT | Mod: 76

## 2020-02-03 PROCEDURE — 71045 X-RAY EXAM CHEST 1 VIEW: CPT

## 2020-02-03 PROCEDURE — 84484 ASSAY OF TROPONIN QUANT: CPT

## 2020-02-03 NOTE — ED ADULT NURSE NOTE - OBJECTIVE STATEMENT
Pt presents to ED from home, pt alert and oriientedx4, VSS afebrile, pt states he was waking to the kticchen became dizzy nauseas and sweaty and then syncopized. wife caught pt, tp denies pain or injury from syncoope. neuros intact, safety maintained will continue to monitor

## 2020-02-03 NOTE — ED PROVIDER NOTE - ADDITIONAL RISK FACTOR FREE TEXT BOX
Consider vasovagal syncope vs structured cardiac abnormality. Pt age and slight atypical syncope present warrant admission for observation.

## 2020-02-03 NOTE — ED PROVIDER NOTE - CARE PROVIDER_API CALL
Gil Gaspar)  Cardiovascular Disease; Internal Medicine  [address]  [place] [address]  Phone: [phone]  Fax: [phone]  Follow Up Time:

## 2020-02-03 NOTE — ED PROVIDER NOTE - RATE
Writer received return PC from pt. Made aware of brain MRI results. Pt was happy with this information as anticipated.  Denies additional needs. Return to clinic as scheduled  
75

## 2020-02-03 NOTE — ED PROVIDER NOTE - PATIENT PORTAL LINK FT
You can access the FollowMyHealth Patient Portal offered by [facility] by registering at the following website: [email or web address]. By joining [employer]’s FollowMyHealth portal, you will also be able to view your health information using other applications (apps) compatible with our system.

## 2020-02-03 NOTE — ED PROVIDER NOTE - NSFOLLOWUPINSTRUCTIONS_ED_ALL_ED_FT
Please follow up with your cardiologist within the next 2-3 days.      Syncope    WHAT YOU NEED TO KNOW:    Syncope is also called fainting or passing out. Syncope is a sudden, temporary loss of consciousness, followed by a fall from a standing or sitting position. Syncope ranges from not serious to a sign of a more serious condition that needs to be treated. You can control some health conditions that cause syncope. Your healthcare providers can help you create a plan to manage syncope and prevent episodes.    DISCHARGE INSTRUCTIONS:    Seek care immediately if:     You are bleeding because you hit your head when you fainted.       You suddenly have double vision, difficulty speaking, numbness, and cannot move your arms or legs.      You have chest pain and trouble breathing.      You vomit blood or material that looks like coffee grounds.      You see blood in your bowel movement.    Contact your healthcare provider if:     You have new or worsening symptoms.      You have another syncope episode.      You have a headache, fast heartbeat, or feel too dizzy to stand up.      You have questions or concerns about your condition or care.    Medicines:     Medicines may be needed to help your heart pump strongly and regularly. Your healthcare provider may also make changes to any medicines that are causing syncope.       Take your medicine as directed. Contact your healthcare provider if you think your medicine is not helping or if you have side effects. Tell him or her if you are allergic to any medicine. Keep a list of the medicines, vitamins, and herbs you take. Include the amounts, and when and why you take them. Bring the list or the pill bottles to follow-up visits. Carry your medicine list with you in case of an emergency.    Follow up with your healthcare provider as directed: Write down your questions so you remember to ask them during your visits.     Manage syncope:     Keep a record of your syncope episodes. Include your symptoms and your activity before and after the episode. The record can help your healthcare provider find the cause of your syncope and help you manage episodes.      Sit or lie down when needed. This includes when you feel dizzy, your throat is getting tight, and your vision changes. Raise your legs above the level of your heart.      Take slow, deep breaths if you start to breathe faster with anxiety or fear. This can help decrease dizziness and the feeling that you might faint.       Check your blood pressure often. This is important if you take medicine to lower your blood pressure. Check your blood pressure when you are lying down and when you are standing. Ask how often to check during the day. Keep a record of your blood pressure numbers. Your healthcare provider may use the record to help plan your treatment.How to take a Blood Pressure         Prevent a syncope episode:     Move slowly and let yourself get used to one position before you move to another position. This is very important when you change from a lying or sitting position to a standing position. Take some deep breaths before you stand up from a lying position. Stand up slowly. Sudden movements may cause a fainting spell. Sit on the side of the bed or couch for a few minutes before you stand up. If you are on bedrest, try to be upright for about 2 hours each day, or as directed. Do not lock your legs if you are standing for a long period of time. Move your legs and bend your knees to keep blood flowing.      Follow your healthcare provider's recommendations. Your provider may recommend that you drink more liquids to prevent dehydration. You may also need to have more salt to keep your blood pressure from dropping too low and causing syncope. Your provider will tell you how much liquid and sodium to have each day. He or she will also tell you how much physical activity is safe for you. This will depend on what is causing your syncope.      Watch for signs of low blood sugar. These include hunger, nervousness, sweating, and fast or fluttery heartbeats. Talk with your healthcare provider about ways to keep your blood sugar level steady.      Do not strain if you are constipated. You may faint if you strain to have a bowel movement. Walking is the best way to get your bowels moving. Eat foods high in fiber to make it easier to have a bowel movement. Good examples are high-fiber cereals, beans, vegetables, and whole-grain breads. Prune juice may help make bowel movements softer.      Be careful in hot weather. Heat can cause a syncope episode. Limit activity done outside on hot days. Physical activity in hot weather can lead to dehydration. This can cause an episode.

## 2020-02-03 NOTE — ED PROVIDER NOTE - CLINICAL SUMMARY MEDICAL DECISION MAKING FREE TEXT BOX
Pt presenting with syncopal episode today.  Was caught by family.  Unclear if patient fully passed out.  Does have a concerning prior history.  Sounds as though he was leaving the bathroom, felt pale, diaphoretic and then felt limp.  No CP/palpitations/sob.  Do not suspect PE given lack of cp/sob - below threshold for further w/u.  No cp to suggest ACS.  EKG unremarkable.  Trop negative.  No s/sx of anemia.  H/H stable.  No cp/abd pain to suggest AAA/dissection.  No e/o underlying arrhythmia on ekg or on prolonged observation.  Discussed possibility of dangerous underlying arrythmia and discussed admission for further w/u.   Pt declines admission and patient understands risks of d/c home.  D/c home with strict return precautions and prompt outpatient f/u.

## 2020-03-11 ENCOUNTER — OUTPATIENT (OUTPATIENT)
Dept: OUTPATIENT SERVICES | Facility: HOSPITAL | Age: 70
LOS: 1 days | End: 2020-03-11
Payer: MEDICARE

## 2020-03-11 ENCOUNTER — APPOINTMENT (OUTPATIENT)
Dept: ULTRASOUND IMAGING | Facility: HOSPITAL | Age: 70
End: 2020-03-11
Payer: MEDICARE

## 2020-03-11 DIAGNOSIS — R35.1 NOCTURIA: ICD-10-CM

## 2020-03-11 DIAGNOSIS — S69.90XA UNSPECIFIED INJURY OF UNSPECIFIED WRIST, HAND AND FINGER(S), INITIAL ENCOUNTER: Chronic | ICD-10-CM

## 2020-03-11 DIAGNOSIS — Z00.00 ENCOUNTER FOR GENERAL ADULT MEDICAL EXAMINATION WITHOUT ABNORMAL FINDINGS: ICD-10-CM

## 2020-03-11 DIAGNOSIS — Z87.81 PERSONAL HISTORY OF (HEALED) TRAUMATIC FRACTURE: Chronic | ICD-10-CM

## 2020-03-11 PROCEDURE — 76872 US TRANSRECTAL: CPT | Mod: 26

## 2020-03-11 PROCEDURE — 76872 US TRANSRECTAL: CPT

## 2020-04-01 NOTE — ED ADULT NURSE NOTE - NS ED NURSE DC TEACHING
"Occupational Therapy Evaluation completed.   Functional Status:    54 y/o male admitted to hospital with feeling of \"food stuck I throat\", ETOH withdrawal, delusions. Found to be positive for meth, pt is homeless, previously residing at [facility]. Pt is doing well today mobility wise, demonstrating STS, toilet xfers supv, LB dressing and showering Mod I. Pt with no further acute OT needs at this time. Additionally, pt was consulted for debility, however he is demonstrating very functional mobility, not an appropriate consult.    Plan of Care: Patient with no further skilled OT needs in the acute care setting at this time  Discharge Recommendations:  Equipment: No Equipment Needed. Post-acute therapy Anticipate that the patient will have no further occupational therapy needs after discharge from the hospital.     See \"Rehab Therapy-Acute\" Patient Summary Report for complete documentation.    " Cardiac

## 2020-07-29 NOTE — ED ADULT NURSE REASSESSMENT NOTE - NS ED NURSE REASSESS COMMENT FT1
[facility] POST ANESTHESIA CARE UNIT  Brief Op Note       Patients Name: Carmela Mercado  Attending Physician: Ta Alonso MD  Operating Physician: Ta Alonso MD  CSN: [id number]     Location:  OR  MRN: [id number]    Date of Birth: [birth year]
Awaiting results of second troponin ,, pt resting in bed , cardiac monitoring in progress

## 2021-03-30 NOTE — ED ADULT NURSE NOTE - GASTROINTESTINAL ASSESSMENT
Continue: Pred Forte (prednisolone acetate): drops,suspension: 1% 1 drop four times a day into affected eye 02- - - -

## 2022-02-18 ENCOUNTER — NON-APPOINTMENT (OUTPATIENT)
Age: 72
End: 2022-02-18

## 2022-02-18 ENCOUNTER — APPOINTMENT (OUTPATIENT)
Dept: NEUROLOGY | Facility: CLINIC | Age: 72
End: 2022-02-18
Payer: MEDICARE

## 2022-02-18 VITALS — HEART RATE: 86 BPM | DIASTOLIC BLOOD PRESSURE: 110 MMHG | SYSTOLIC BLOOD PRESSURE: 172 MMHG

## 2022-02-18 VITALS
BODY MASS INDEX: 33.49 KG/M2 | HEART RATE: 88 BPM | TEMPERATURE: 98 F | SYSTOLIC BLOOD PRESSURE: 160 MMHG | HEIGHT: 63 IN | WEIGHT: 189 LBS | DIASTOLIC BLOOD PRESSURE: 82 MMHG

## 2022-02-18 DIAGNOSIS — Z86.79 PERSONAL HISTORY OF OTHER DISEASES OF THE CIRCULATORY SYSTEM: ICD-10-CM

## 2022-02-18 DIAGNOSIS — I10 ESSENTIAL (PRIMARY) HYPERTENSION: ICD-10-CM

## 2022-02-18 DIAGNOSIS — I25.10 ATHEROSCLEROTIC HEART DISEASE OF NATIVE CORONARY ARTERY W/OUT ANGINA PECTORIS: ICD-10-CM

## 2022-02-18 PROCEDURE — 99203 OFFICE O/P NEW LOW 30 MIN: CPT

## 2022-02-18 RX ORDER — ALLOPURINOL 300 MG/1
300 TABLET ORAL
Refills: 0 | Status: ACTIVE | COMMUNITY

## 2022-02-18 NOTE — HISTORY OF PRESENT ILLNESS
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 There is no weakness, no pain, denies any trouble walking, no change in balance, no numbness of the extremities. Symptoms are worse when he is stressed or tired,relieved at rest or when he relaxes.No history of antidepressant, antipsychotic medications, no no thyroid disease, no diabetes,no history of stroke.

## 2022-02-18 NOTE — DISCUSSION/SUMMARY
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 Discussed referral to neurosurgery, for evaluated for deep brain stimulation.\par Return to office, 6-8 weeks

## 2022-02-18 NOTE — PHYSICAL EXAM
[General Appearance - Alert] : alert [General Appearance - In No Acute Distress] : in no acute distress [Oriented To Time, Place, And Person] : oriented to person, place, and time [Impaired Insight] : insight and judgment were intact [Affect] : the affect was normal [Person] : oriented to person [Place] : oriented to place [Time] : oriented to time [Concentration Intact] : normal concentrating ability [Visual Intact] : visual attention was ~T not ~L decreased [Naming Objects] : no difficulty naming common objects [Repeating Phrases] : no difficulty repeating a phrase [Writing A Sentence] : no difficulty writing a sentence [Fluency] : fluency intact [Comprehension] : comprehension intact [Reading] : reading intact [Past History] : adequate knowledge of personal past history [Cranial Nerves Optic (II)] : visual acuity intact bilaterally,  visual fields full to confrontation, pupils equal round and reactive to light [Cranial Nerves Oculomotor (III)] : extraocular motion intact [Cranial Nerves Trigeminal (V)] : facial sensation intact symmetrically [Cranial Nerves Facial (VII)] : face symmetrical [Cranial Nerves Vestibulocochlear (VIII)] : hearing was intact bilaterally [Cranial Nerves Glossopharyngeal (IX)] : tongue and palate midline [Cranial Nerves Accessory (XI - Cranial And Spinal)] : head turning and shoulder shrug symmetric [Cranial Nerves Hypoglossal (XII)] : there was no tongue deviation with protrusion [Motor Tone] : muscle tone was normal in all four extremities [Motor Strength] : muscle strength was normal in all four extremities [No Muscle Atrophy] : normal bulk in all four extremities [Sensation Tactile Decrease] : light touch was intact [Abnormal Walk] : normal gait [Balance] : balance was intact [Tremor] : a tremor present [2+] : Ankle jerk left 2+ [Past-pointing] : there was no past-pointing [Plantar Reflex Right Only] : normal on the right [Plantar Reflex Left Only] : normal on the left

## 2022-03-01 ENCOUNTER — APPOINTMENT (OUTPATIENT)
Dept: NEUROSURGERY | Facility: CLINIC | Age: 72
End: 2022-03-01
Payer: MEDICARE

## 2022-03-01 VITALS
BODY MASS INDEX: 33.49 KG/M2 | DIASTOLIC BLOOD PRESSURE: 87 MMHG | HEIGHT: 63 IN | SYSTOLIC BLOOD PRESSURE: 145 MMHG | WEIGHT: 189 LBS | OXYGEN SATURATION: 97 % | HEART RATE: 71 BPM

## 2022-03-01 DIAGNOSIS — G25.0 ESSENTIAL TREMOR: ICD-10-CM

## 2022-03-01 PROCEDURE — 99203 OFFICE O/P NEW LOW 30 MIN: CPT

## 2022-03-01 RX ORDER — TAMSULOSIN HYDROCHLORIDE 0.4 MG/1
0.4 CAPSULE ORAL
Refills: 0 | Status: ACTIVE | COMMUNITY

## 2022-03-01 RX ORDER — LOSARTAN POTASSIUM 100 MG/1
100 TABLET, FILM COATED ORAL
Refills: 0 | Status: ACTIVE | COMMUNITY

## 2022-03-02 PROBLEM — G25.0 ESSENTIAL TREMOR: Status: ACTIVE | Noted: 2022-03-02

## 2022-03-07 NOTE — ASSESSMENT
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  He is able to perform fine motor tasks with no issues.  Explained to continue conservative medical management with neurology/ or PCP, and return if symptoms worsen.\par \par f/u PRN

## 2022-03-07 NOTE — REASON FOR VISIT
[Consultation] : a consultation visit [Referred By: _________] : Patient was referred by LEONA [Spouse] : spouse [FreeTextEntry1] : tremors

## 2022-03-07 NOTE — HISTORY OF PRESENT ILLNESS
[> 3 months] : more  than 3 months [FreeTextEntry1] : tremors consult [de-identified] : 70 yo RHD male with PMH of CAD s/p stent (no a/c), with onset of a few years.  He has tried carbidopa/levodopa with no benefit, then recently started Inderal with marked improvement of tremors and able to continue work.\par \par

## 2022-03-07 NOTE — PHYSICAL EXAM
[General Appearance - Alert] : alert [General Appearance - In No Acute Distress] : in no acute distress [Oriented To Time, Place, And Person] : oriented to person, place, and time [Impaired Insight] : insight and judgment were intact [5] : S1 ankle flexors 5/5 [Normal] : normal [Sclera] : the sclera and conjunctiva were normal [Hearing Threshold Finger Rub Not [place]] : hearing was normal [Neck Appearance] : the appearance of the neck was normal [] : no respiratory distress [Respiration, Rhythm And Depth] : normal respiratory rhythm and effort [Edema] : there was no peripheral edema [Abnormal Walk] : normal gait [Motor Tone] : muscle strength and tone were normal [Skin Color & Pigmentation] : normal skin color and pigmentation [FreeTextEntry8] : very slight fine tremor hands [FreeTextEntry1] : slight fine hand tremors

## 2022-03-10 ENCOUNTER — APPOINTMENT (OUTPATIENT)
Dept: UROLOGY | Facility: CLINIC | Age: 72
End: 2022-03-10
Payer: MEDICARE

## 2022-03-10 DIAGNOSIS — N40.1 BENIGN PROSTATIC HYPERPLASIA WITH LOWER URINARY TRACT SYMPMS: ICD-10-CM

## 2022-03-10 DIAGNOSIS — Z80.9 FAMILY HISTORY OF MALIGNANT NEOPLASM, UNSPECIFIED: ICD-10-CM

## 2022-03-10 DIAGNOSIS — N18.4 CHRONIC KIDNEY DISEASE, STAGE 4 (SEVERE): ICD-10-CM

## 2022-03-10 DIAGNOSIS — N13.8 BENIGN PROSTATIC HYPERPLASIA WITH LOWER URINARY TRACT SYMPMS: ICD-10-CM

## 2022-03-10 DIAGNOSIS — Z80.42 FAMILY HISTORY OF MALIGNANT NEOPLASM OF PROSTATE: ICD-10-CM

## 2022-03-10 LAB
PSA FREE FLD-MCNC: 10 %
PSA FREE SERPL-MCNC: 2.21 NG/ML
PSA SERPL-MCNC: 21.9 NG/ML

## 2022-03-10 PROCEDURE — 99204 OFFICE O/P NEW MOD 45 MIN: CPT

## 2022-03-10 RX ORDER — TAMSULOSIN HYDROCHLORIDE 0.4 MG/1
0.4 CAPSULE ORAL
Refills: 0 | Status: COMPLETED | COMMUNITY
End: 2022-03-10

## 2022-03-10 RX ORDER — ALLOPURINOL 100 MG/1
100 TABLET ORAL
Refills: 0 | Status: COMPLETED | COMMUNITY
End: 2022-03-10

## 2022-03-19 PROBLEM — Z80.42 FAMILY HISTORY OF MALIGNANT NEOPLASM OF PROSTATE: Status: ACTIVE | Noted: 2022-03-19

## 2022-03-19 PROBLEM — Z80.9 FAMILY HISTORY OF MALIGNANT NEOPLASM: Status: ACTIVE | Noted: 2022-03-19

## 2022-03-19 RX ORDER — CARBIDOPA AND LEVODOPA 25; 250 MG/1; MG/1
25-250 TABLET ORAL 3 TIMES DAILY
Refills: 0 | Status: COMPLETED | COMMUNITY
End: 2022-03-19

## 2022-03-19 NOTE — HISTORY OF PRESENT ILLNESS
[FreeTextEntry1] : Referred for evaluation of elevated PSA.\par BPH: Currently on tamsulosin 0.4 mg once daily.  Overall urinary function is well controlled.\par Is a normal urinary stream, and incomplete bladder emptying.  No gross hematuria, dysuria or incontinence.  Nocturia 1 time per night.  He is never had prior surgery on his prostate or bladder.\par \par Review of PSA results over the last several years shows steady climb from less than 20 ng/mL, to 36 ng/mL on most recent result.  Patient has a family history of prostate cancer: Father.\par \par He has never had a previous prostate biopsy. In 2020: TRUS showed 55 mL prostate.\par \par ROS reviewed on intake form.

## 2022-03-19 NOTE — PHYSICAL EXAM
[General Appearance - Well Developed] : well developed [General Appearance - Well Nourished] : well nourished [Normal Appearance] : normal appearance [Well Groomed] : well groomed [General Appearance - In No Acute Distress] : no acute distress [Edema] : no peripheral edema [Respiration, Rhythm And Depth] : normal respiratory rhythm and effort [Exaggerated Use Of Accessory Muscles For Inspiration] : no accessory muscle use [Abdomen Soft] : soft [Abdomen Tenderness] : non-tender [Urethral Meatus] : meatus normal [Costovertebral Angle Tenderness] : no ~M costovertebral angle tenderness [Urinary Bladder Findings] : the bladder was normal on palpation [Scrotum] : the scrotum was normal [Testes Mass (___cm)] : there were no testicular masses [No Prostate Nodules] : no prostate nodules [Prostate Size ___ gm] : prostate size [unfilled] gm [Normal Station and Gait] : the gait and station were normal for the patient's age [] : no rash [No Focal Deficits] : no focal deficits [Oriented To Time, Place, And Person] : oriented to person, place, and time [Affect] : the affect was normal [Mood] : the mood was normal [Not Anxious] : not anxious [No Palpable Adenopathy] : no palpable adenopathy

## 2022-03-19 NOTE — ASSESSMENT
[FreeTextEntry1] : Reviewed PSA testing and prostate cancer screening.  PSA remains markedly elevated.\par Repeat PSA F/T today.\par Recommend MRI prostate.  Will call with results.  If suspicious lesion noted, then will require MRI/US fusion biopsy of the prostate.\par All questions answered.

## 2022-04-08 ENCOUNTER — APPOINTMENT (OUTPATIENT)
Dept: NEUROLOGY | Facility: CLINIC | Age: 72
End: 2022-04-08
Payer: MEDICARE

## 2022-04-08 VITALS
HEIGHT: 63 IN | WEIGHT: 190 LBS | SYSTOLIC BLOOD PRESSURE: 171 MMHG | TEMPERATURE: 97.8 F | HEART RATE: 65 BPM | DIASTOLIC BLOOD PRESSURE: 85 MMHG | BODY MASS INDEX: 33.66 KG/M2

## 2022-04-08 PROCEDURE — 99213 OFFICE O/P EST LOW 20 MIN: CPT

## 2022-04-08 NOTE — HISTORY OF PRESENT ILLNESS
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 Tolerating the medication, find his tremors are much better controlled, although still present but manageable.

## 2022-04-08 NOTE — ASSESSMENT
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 Tolerated the medication well. Discussed and reviewed options.\par Plan: We'll try a higher dose of Inderal to 80 mg twice a day.Discussed possible side effects.\par return to office, 4 months.

## 2022-05-06 ENCOUNTER — RESULT REVIEW (OUTPATIENT)
Age: 72
End: 2022-05-06

## 2022-05-06 ENCOUNTER — APPOINTMENT (OUTPATIENT)
Dept: MRI IMAGING | Facility: CLINIC | Age: 72
End: 2022-05-06
Payer: MEDICARE

## 2022-05-06 ENCOUNTER — OUTPATIENT (OUTPATIENT)
Dept: OUTPATIENT SERVICES | Facility: HOSPITAL | Age: 72
LOS: 1 days | End: 2022-05-06
Payer: COMMERCIAL

## 2022-05-06 DIAGNOSIS — S69.90XA UNSPECIFIED INJURY OF UNSPECIFIED WRIST, HAND AND FINGER(S), INITIAL ENCOUNTER: Chronic | ICD-10-CM

## 2022-05-06 DIAGNOSIS — R97.20 ELEVATED PROSTATE SPECIFIC ANTIGEN [PSA]: ICD-10-CM

## 2022-05-06 DIAGNOSIS — Z87.81 PERSONAL HISTORY OF (HEALED) TRAUMATIC FRACTURE: Chronic | ICD-10-CM

## 2022-05-06 PROCEDURE — 76498 UNLISTED MR PROCEDURE: CPT

## 2022-05-06 PROCEDURE — 72197 MRI PELVIS W/O & W/DYE: CPT

## 2022-05-06 PROCEDURE — A9585: CPT

## 2022-05-06 PROCEDURE — 72197 MRI PELVIS W/O & W/DYE: CPT | Mod: 26

## 2022-05-06 PROCEDURE — 76498P: CUSTOM | Mod: 26

## 2022-05-13 ENCOUNTER — NON-APPOINTMENT (OUTPATIENT)
Age: 72
End: 2022-05-13

## 2022-05-23 ENCOUNTER — APPOINTMENT (OUTPATIENT)
Dept: UROLOGY | Facility: CLINIC | Age: 72
End: 2022-05-23
Payer: MEDICARE

## 2022-05-23 DIAGNOSIS — N52.01 ERECTILE DYSFUNCTION DUE TO ARTERIAL INSUFFICIENCY: ICD-10-CM

## 2022-05-23 PROCEDURE — 99214 OFFICE O/P EST MOD 30 MIN: CPT

## 2022-05-23 RX ORDER — LOSARTAN POTASSIUM 100 MG/1
100 TABLET, FILM COATED ORAL
Refills: 0 | Status: COMPLETED | COMMUNITY
End: 2022-05-23

## 2022-05-23 RX ORDER — TADALAFIL 10 MG/1
10 TABLET, FILM COATED ORAL
Qty: 10 | Refills: 5 | Status: ACTIVE | COMMUNITY
Start: 2022-05-23 | End: 1900-01-01

## 2022-05-23 RX ORDER — PROPRANOLOL HYDROCHLORIDE 60 MG/1
60 TABLET ORAL
Refills: 0 | Status: COMPLETED | COMMUNITY
End: 2022-05-23

## 2022-05-23 NOTE — HISTORY OF PRESENT ILLNESS
[FreeTextEntry1] : Referred for evaluation of elevated PSA.\par \par \par Review of PSA results over the last several years shows steady climb from less than 20 ng/mL, to 36 ng/mL on most recent result.  Patient has a family history of prostate cancer: Father.\par \par Most recent PSA 3/10/2022: 21.9 ng/mL.\par \par MRI prostate 5/6/2022: 65 mL, PIRAD 5 lesion R/L transverse plan\par \par He has never had a previous prostate biopsy. In 2020: TRUS showed 55 mL prostate.\par

## 2022-05-23 NOTE — ASSESSMENT
[FreeTextEntry1] : MRI prostate images reviewed with the patient.\par Recommend to undergo MRI/US fusion biopsy of the prostate.  Procedure reviewed including time in the office, recovery time.  Potential risks of prostate biopsy discussed including infection that results in hospitalization, hematuria, rectal bleeding, hematospermia, and urinary retention.  Preparation for biopsy including instructions for enema reviewed.  His medication list was reviewed and if necessary, he was counseled regarding which medications to discontinue prior to the biopsy.  All questions answered.\par

## 2022-06-09 ENCOUNTER — NON-APPOINTMENT (OUTPATIENT)
Age: 72
End: 2022-06-09

## 2022-06-16 ENCOUNTER — APPOINTMENT (OUTPATIENT)
Dept: UROLOGY | Facility: CLINIC | Age: 72
End: 2022-06-16

## 2022-07-01 ENCOUNTER — RX RENEWAL (OUTPATIENT)
Age: 72
End: 2022-07-01

## 2022-07-08 ENCOUNTER — RX ONLY (RX ONLY)
Age: 72
End: 2022-07-08

## 2022-07-08 ENCOUNTER — OFFICE (OUTPATIENT)
Dept: URBAN - METROPOLITAN AREA CLINIC 35 | Facility: CLINIC | Age: 72
Setting detail: OPHTHALMOLOGY
End: 2022-07-08
Payer: MEDICARE

## 2022-07-08 DIAGNOSIS — D31.62: ICD-10-CM

## 2022-07-08 DIAGNOSIS — H02.403: ICD-10-CM

## 2022-07-08 DIAGNOSIS — D31.61: ICD-10-CM

## 2022-07-08 PROCEDURE — 92285 EXTERNAL OCULAR PHOTOGRAPHY: CPT | Performed by: OPHTHALMOLOGY

## 2022-07-08 PROCEDURE — 99204 OFFICE O/P NEW MOD 45 MIN: CPT | Performed by: OPHTHALMOLOGY

## 2022-07-08 ASSESSMENT — LID POSITION - PTOSIS
OD_PTOSIS: RUL
OS_PTOSIS: LUL

## 2022-07-08 ASSESSMENT — VISUAL ACUITY
OS_BCVA: 20/20-
OD_BCVA: 20/50+

## 2022-07-08 ASSESSMENT — CONFRONTATIONAL VISUAL FIELD TEST (CVF)
OS_FINDINGS: FULL
OD_FINDINGS: FULL

## 2022-07-15 ENCOUNTER — OFFICE (OUTPATIENT)
Dept: URBAN - METROPOLITAN AREA CLINIC 12 | Facility: CLINIC | Age: 72
Setting detail: OPHTHALMOLOGY
End: 2022-07-15
Payer: MEDICARE

## 2022-07-15 DIAGNOSIS — H35.033: ICD-10-CM

## 2022-07-15 DIAGNOSIS — H43.12: ICD-10-CM

## 2022-07-15 DIAGNOSIS — H25.13: ICD-10-CM

## 2022-07-15 DIAGNOSIS — H25.813: ICD-10-CM

## 2022-07-15 DIAGNOSIS — H02.403: ICD-10-CM

## 2022-07-15 DIAGNOSIS — H35.372: ICD-10-CM

## 2022-07-15 PROCEDURE — 92014 COMPRE OPH EXAM EST PT 1/>: CPT | Performed by: OPTOMETRIST

## 2022-07-15 PROCEDURE — 92134 CPTRZ OPH DX IMG PST SGM RTA: CPT | Performed by: OPTOMETRIST

## 2022-07-15 ASSESSMENT — KERATOMETRY
OS_K2POWER_DIOPTERS: 48.25
OD_K2POWER_DIOPTERS: 48.50
OD_AXISANGLE_DEGREES: 013
METHOD_AUTO_MANUAL: AUTO
OD_K1POWER_DIOPTERS: 47.00
OS_K1POWER_DIOPTERS: 47.75
OS_AXISANGLE_DEGREES: 013

## 2022-07-15 ASSESSMENT — REFRACTION_MANIFEST
OD_CYLINDER: -1.50
OD_SPHERE: +1.50
OD_AXIS: 092
OS_AXIS: 097
OS_SPHERE: +1.25
OS_VA1: 20/40-
OD_VA1: 20/20
OS_CYLINDER: -1.50

## 2022-07-15 ASSESSMENT — REFRACTION_CURRENTRX
OS_OVR_VA: 20/
OD_OVR_VA: 20/
OS_ADD: +1.50
OD_ADD: +1.50
OS_VPRISM_DIRECTION: SV
OD_VPRISM_DIRECTION: SV

## 2022-07-15 ASSESSMENT — REFRACTION_AUTOREFRACTION
OS_CYLINDER: -1.50
OD_SPHERE: +2.25
OD_CYLINDER: -1.75
OS_SPHERE: +0.75
OS_AXIS: 097
OD_AXIS: 093

## 2022-07-15 ASSESSMENT — TONOMETRY
OS_IOP_MMHG: 16
OD_IOP_MMHG: 15

## 2022-07-15 ASSESSMENT — AXIALLENGTH_DERIVED
OS_AL: 22.0472
OS_AL: 21.8784
OD_AL: 21.8736
OD_AL: 21.6663

## 2022-07-15 ASSESSMENT — CONFRONTATIONAL VISUAL FIELD TEST (CVF)
OD_FINDINGS: FULL
OS_FINDINGS: FULL

## 2022-07-15 ASSESSMENT — LID POSITION - PTOSIS
OD_PTOSIS: RUL
OS_PTOSIS: LUL

## 2022-07-15 ASSESSMENT — SPHEQUIV_DERIVED
OS_SPHEQUIV: 0.5
OD_SPHEQUIV: 1.375
OS_SPHEQUIV: 0
OD_SPHEQUIV: 0.75

## 2022-07-15 ASSESSMENT — VISUAL ACUITY
OD_BCVA: 20/60+1
OS_BCVA: 20/20-

## 2022-07-22 ENCOUNTER — OFFICE (OUTPATIENT)
Dept: URBAN - METROPOLITAN AREA CLINIC 102 | Facility: CLINIC | Age: 72
Setting detail: OPHTHALMOLOGY
End: 2022-07-22
Payer: MEDICARE

## 2022-07-22 DIAGNOSIS — D31.62: ICD-10-CM

## 2022-07-22 DIAGNOSIS — D31.61: ICD-10-CM

## 2022-07-22 PROCEDURE — 99211 OFF/OP EST MAY X REQ PHY/QHP: CPT | Performed by: OPHTHALMOLOGY

## 2022-07-26 ENCOUNTER — ASC (OUTPATIENT)
Dept: URBAN - METROPOLITAN AREA SURGERY 8 | Facility: SURGERY | Age: 72
Setting detail: OPHTHALMOLOGY
End: 2022-07-26
Payer: MEDICARE

## 2022-07-26 DIAGNOSIS — D31.62: ICD-10-CM

## 2022-07-26 PROCEDURE — 67400 EXPLORE/BIOPSY EYE SOCKET: CPT | Performed by: OPHTHALMOLOGY

## 2022-07-27 ASSESSMENT — KERATOMETRY
METHOD_AUTO_MANUAL: AUTO
OD_AXISANGLE_DEGREES: 013
OD_K2POWER_DIOPTERS: 48.50
OS_AXISANGLE_DEGREES: 013
OS_K1POWER_DIOPTERS: 47.75
OD_K1POWER_DIOPTERS: 47.00
OS_K2POWER_DIOPTERS: 48.25

## 2022-07-27 ASSESSMENT — REFRACTION_MANIFEST
OD_VA1: 20/20
OS_VA1: 20/40-
OS_SPHERE: +1.25
OD_AXIS: 092
OD_SPHERE: +1.50
OS_CYLINDER: -1.50
OS_AXIS: 097
OD_CYLINDER: -1.50

## 2022-07-27 ASSESSMENT — REFRACTION_AUTOREFRACTION
OS_SPHERE: +0.75
OD_CYLINDER: -1.75
OS_CYLINDER: -1.50
OD_AXIS: 093
OD_SPHERE: +2.25
OS_AXIS: 097

## 2022-07-27 ASSESSMENT — REFRACTION_CURRENTRX
OD_VPRISM_DIRECTION: SV
OS_VPRISM_DIRECTION: SV
OD_OVR_VA: 20/
OS_ADD: +1.50
OD_ADD: +1.50
OS_OVR_VA: 20/

## 2022-07-27 ASSESSMENT — SPHEQUIV_DERIVED
OD_SPHEQUIV: 0.75
OS_SPHEQUIV: 0.5
OS_SPHEQUIV: 0
OD_SPHEQUIV: 1.375

## 2022-07-27 ASSESSMENT — AXIALLENGTH_DERIVED
OD_AL: 21.6663
OS_AL: 21.8784
OD_AL: 21.8736
OS_AL: 22.0472

## 2022-07-27 ASSESSMENT — LID POSITION - PTOSIS
OD_PTOSIS: RUL
OS_PTOSIS: LUL

## 2022-07-27 ASSESSMENT — VISUAL ACUITY
OD_BCVA: 20/60+1
OS_BCVA: 20/20-

## 2022-08-03 ENCOUNTER — OFFICE (OUTPATIENT)
Dept: URBAN - METROPOLITAN AREA CLINIC 102 | Facility: CLINIC | Age: 72
Setting detail: OPHTHALMOLOGY
End: 2022-08-03
Payer: MEDICARE

## 2022-08-03 DIAGNOSIS — D31.61: ICD-10-CM

## 2022-08-03 PROCEDURE — 92012 INTRM OPH EXAM EST PATIENT: CPT | Performed by: OPHTHALMOLOGY

## 2022-08-03 ASSESSMENT — SPHEQUIV_DERIVED
OD_SPHEQUIV: 1.375
OS_SPHEQUIV: 0

## 2022-08-03 ASSESSMENT — KERATOMETRY
OS_K2POWER_DIOPTERS: 48.25
OD_AXISANGLE_DEGREES: 013
OS_K1POWER_DIOPTERS: 47.75
OD_K2POWER_DIOPTERS: 48.50
OS_AXISANGLE_DEGREES: 013
METHOD_AUTO_MANUAL: AUTO
OD_K1POWER_DIOPTERS: 47.00

## 2022-08-03 ASSESSMENT — REFRACTION_AUTOREFRACTION
OD_SPHERE: +2.25
OS_AXIS: 097
OD_AXIS: 093
OS_SPHERE: +0.75
OS_CYLINDER: -1.50
OD_CYLINDER: -1.75

## 2022-08-03 ASSESSMENT — AXIALLENGTH_DERIVED
OS_AL: 22.0472
OD_AL: 21.6663

## 2022-08-03 ASSESSMENT — VISUAL ACUITY
OD_BCVA: 20/50-1
OS_BCVA: 20/20-1

## 2022-08-03 ASSESSMENT — LID POSITION - PTOSIS
OD_PTOSIS: RUL
OS_PTOSIS: LUL

## 2022-08-03 ASSESSMENT — CONFRONTATIONAL VISUAL FIELD TEST (CVF)
OS_FINDINGS: FULL
OD_FINDINGS: FULL

## 2022-08-05 ENCOUNTER — OFFICE (OUTPATIENT)
Dept: URBAN - METROPOLITAN AREA CLINIC 102 | Facility: CLINIC | Age: 72
Setting detail: OPHTHALMOLOGY
End: 2022-08-05
Payer: MEDICARE

## 2022-08-05 DIAGNOSIS — D31.61: ICD-10-CM

## 2022-08-05 PROCEDURE — 99211 OFF/OP EST MAY X REQ PHY/QHP: CPT | Performed by: OPHTHALMOLOGY

## 2022-08-06 ASSESSMENT — VISUAL ACUITY
OS_BCVA: 20/20-1
OD_BCVA: 20/50-1

## 2022-08-06 ASSESSMENT — SPHEQUIV_DERIVED
OD_SPHEQUIV: 1.375
OS_SPHEQUIV: 0

## 2022-08-06 ASSESSMENT — KERATOMETRY
OD_K2POWER_DIOPTERS: 48.50
OS_K1POWER_DIOPTERS: 47.75
OD_K1POWER_DIOPTERS: 47.00
OS_K2POWER_DIOPTERS: 48.25
OS_AXISANGLE_DEGREES: 013
METHOD_AUTO_MANUAL: AUTO
OD_AXISANGLE_DEGREES: 013

## 2022-08-06 ASSESSMENT — REFRACTION_AUTOREFRACTION
OD_AXIS: 093
OS_CYLINDER: -1.50
OS_SPHERE: +0.75
OD_CYLINDER: -1.75
OS_AXIS: 097
OD_SPHERE: +2.25

## 2022-08-06 ASSESSMENT — AXIALLENGTH_DERIVED
OS_AL: 22.0472
OD_AL: 21.6663

## 2022-08-09 ENCOUNTER — ASC (OUTPATIENT)
Dept: URBAN - METROPOLITAN AREA SURGERY 8 | Facility: SURGERY | Age: 72
Setting detail: OPHTHALMOLOGY
End: 2022-08-09
Payer: MEDICARE

## 2022-08-09 DIAGNOSIS — D31.61: ICD-10-CM

## 2022-08-09 PROCEDURE — 67400 EXPLORE/BIOPSY EYE SOCKET: CPT | Performed by: OPHTHALMOLOGY

## 2022-08-11 RX ORDER — PROPRANOLOL HYDROCHLORIDE 60 MG/1
60 TABLET ORAL
Qty: 60 | Refills: 0 | Status: DISCONTINUED | COMMUNITY
Start: 2022-02-18 | End: 2022-08-11

## 2022-08-17 ENCOUNTER — OFFICE (OUTPATIENT)
Dept: URBAN - METROPOLITAN AREA CLINIC 102 | Facility: CLINIC | Age: 72
Setting detail: OPHTHALMOLOGY
End: 2022-08-17

## 2022-08-17 DIAGNOSIS — D31.62: ICD-10-CM

## 2022-08-17 DIAGNOSIS — D31.61: ICD-10-CM

## 2022-08-17 PROBLEM — H35.033 HYPERTENSIVE RETINOPATHY; BOTH EYES: Status: ACTIVE | Noted: 2022-07-15

## 2022-08-17 PROBLEM — H02.403 PTOSIS OF EYELID, UNSPECIFIED: Status: ACTIVE | Noted: 2022-07-08

## 2022-08-17 PROBLEM — H25.13 CATARACT SENILE NUCLEAR SCLEROSIS; BOTH EYES: Status: ACTIVE | Noted: 2022-07-15

## 2022-08-17 PROBLEM — H35.372 EPIRETINAL MEMBRANE; LEFT EYE: Status: ACTIVE | Noted: 2022-07-15

## 2022-08-17 PROBLEM — H52.7 REFRACTIVE ERROR ; BOTH EYES: Status: ACTIVE | Noted: 2022-07-08

## 2022-08-17 PROBLEM — H43.12: Status: ACTIVE | Noted: 2022-07-08

## 2022-08-17 PROCEDURE — 99024 POSTOP FOLLOW-UP VISIT: CPT | Performed by: OPHTHALMOLOGY

## 2022-08-17 ASSESSMENT — KERATOMETRY
OS_AXISANGLE_DEGREES: 013
OD_K2POWER_DIOPTERS: 48.50
OS_K1POWER_DIOPTERS: 47.75
OD_AXISANGLE_DEGREES: 013
OD_K1POWER_DIOPTERS: 47.00
OS_K2POWER_DIOPTERS: 48.25
METHOD_AUTO_MANUAL: AUTO

## 2022-08-17 ASSESSMENT — CONFRONTATIONAL VISUAL FIELD TEST (CVF)
OD_FINDINGS: FULL
OS_FINDINGS: FULL

## 2022-08-17 ASSESSMENT — REFRACTION_AUTOREFRACTION
OS_AXIS: 097
OD_AXIS: 093
OS_SPHERE: +0.75
OS_CYLINDER: -1.50
OD_CYLINDER: -1.75
OD_SPHERE: +2.25

## 2022-08-17 ASSESSMENT — VISUAL ACUITY
OD_BCVA: 20/40+1
OS_BCVA: 20/20-1

## 2022-08-17 ASSESSMENT — AXIALLENGTH_DERIVED
OS_AL: 22.0472
OD_AL: 21.6663

## 2022-08-17 ASSESSMENT — LID POSITION - PTOSIS
OS_PTOSIS: LUL
OD_PTOSIS: RUL

## 2022-08-17 ASSESSMENT — TONOMETRY
OS_IOP_MMHG: 16
OD_IOP_MMHG: 16

## 2022-08-17 ASSESSMENT — SPHEQUIV_DERIVED
OS_SPHEQUIV: 0
OD_SPHEQUIV: 1.375

## 2022-08-18 DIAGNOSIS — R97.20 ELEVATED PROSTATE, SPECIFIC ANTIGEN [PSA]: ICD-10-CM

## 2022-09-02 NOTE — ED ADULT TRIAGE NOTE - NS ED TRIAGE AVPU SCALE
Alert-The patient is alert, awake and responds to voice. The patient is oriented to time, place, and person. The triage nurse is able to obtain subjective information. Tarsorrhaphy Text: A tarsorrhaphy was performed using Frost sutures.

## 2022-09-15 ENCOUNTER — APPOINTMENT (OUTPATIENT)
Dept: NEUROLOGY | Facility: CLINIC | Age: 72
End: 2022-09-15

## 2022-09-15 ENCOUNTER — NON-APPOINTMENT (OUTPATIENT)
Age: 72
End: 2022-09-15

## 2022-09-15 VITALS
BODY MASS INDEX: 35.44 KG/M2 | SYSTOLIC BLOOD PRESSURE: 180 MMHG | HEART RATE: 58 BPM | WEIGHT: 200 LBS | DIASTOLIC BLOOD PRESSURE: 99 MMHG | HEIGHT: 63 IN

## 2022-09-15 PROCEDURE — 99213 OFFICE O/P EST LOW 20 MIN: CPT

## 2022-09-15 RX ORDER — DIAZEPAM 5 MG/1
5 TABLET ORAL
Qty: 1 | Refills: 0 | Status: DISCONTINUED | COMMUNITY
Start: 2022-05-23 | End: 2022-09-15

## 2022-12-23 ENCOUNTER — OFFICE (OUTPATIENT)
Dept: URBAN - METROPOLITAN AREA CLINIC 112 | Facility: CLINIC | Age: 72
Setting detail: OPHTHALMOLOGY
End: 2022-12-23
Payer: MEDICARE

## 2022-12-23 DIAGNOSIS — H11.32: ICD-10-CM

## 2022-12-23 PROCEDURE — 92012 INTRM OPH EXAM EST PATIENT: CPT | Performed by: REGISTERED NURSE

## 2022-12-23 ASSESSMENT — KERATOMETRY
METHOD_AUTO_MANUAL: AUTO
OS_K1POWER_DIOPTERS: 47.75
OD_AXISANGLE_DEGREES: 013
OS_K2POWER_DIOPTERS: 48.25
OD_K2POWER_DIOPTERS: 48.50
OD_K1POWER_DIOPTERS: 47.00
OS_AXISANGLE_DEGREES: 013

## 2022-12-23 ASSESSMENT — CONFRONTATIONAL VISUAL FIELD TEST (CVF)
OS_FINDINGS: FULL
OD_FINDINGS: FULL

## 2022-12-23 ASSESSMENT — REFRACTION_AUTOREFRACTION
OD_CYLINDER: -1.75
OS_SPHERE: +0.75
OS_AXIS: 097
OS_CYLINDER: -1.50
OD_AXIS: 093
OD_SPHERE: +2.25

## 2022-12-23 ASSESSMENT — TONOMETRY
OS_IOP_MMHG: 18
OD_IOP_MMHG: 14

## 2022-12-23 ASSESSMENT — SPHEQUIV_DERIVED
OS_SPHEQUIV: 0
OD_SPHEQUIV: 1.375

## 2022-12-23 ASSESSMENT — LID POSITION - PTOSIS
OS_PTOSIS: LUL
OD_PTOSIS: RUL

## 2022-12-23 ASSESSMENT — AXIALLENGTH_DERIVED
OS_AL: 22.0472
OD_AL: 21.6663

## 2022-12-23 ASSESSMENT — VISUAL ACUITY
OS_BCVA: 20/20-1
OD_BCVA: 20/30-

## 2022-12-28 ENCOUNTER — OFFICE (OUTPATIENT)
Dept: URBAN - METROPOLITAN AREA CLINIC 102 | Facility: CLINIC | Age: 72
Setting detail: OPHTHALMOLOGY
End: 2022-12-28
Payer: MEDICARE

## 2022-12-28 DIAGNOSIS — H11.32: ICD-10-CM

## 2022-12-28 DIAGNOSIS — D31.62: ICD-10-CM

## 2022-12-28 DIAGNOSIS — D31.61: ICD-10-CM

## 2022-12-28 PROCEDURE — 92012 INTRM OPH EXAM EST PATIENT: CPT | Performed by: OPHTHALMOLOGY

## 2022-12-28 ASSESSMENT — LID POSITION - PTOSIS
OS_PTOSIS: LUL
OD_PTOSIS: RUL

## 2022-12-28 ASSESSMENT — KERATOMETRY
OD_K1POWER_DIOPTERS: 47.00
OD_K2POWER_DIOPTERS: 48.50
METHOD_AUTO_MANUAL: AUTO
OS_K1POWER_DIOPTERS: 47.75
OD_AXISANGLE_DEGREES: 013
OS_AXISANGLE_DEGREES: 013
OS_K2POWER_DIOPTERS: 48.25

## 2022-12-28 ASSESSMENT — REFRACTION_AUTOREFRACTION
OS_CYLINDER: -1.50
OD_AXIS: 093
OD_SPHERE: +2.25
OD_CYLINDER: -1.75
OS_AXIS: 097
OS_SPHERE: +0.75

## 2022-12-28 ASSESSMENT — AXIALLENGTH_DERIVED
OD_AL: 21.6663
OS_AL: 22.0472

## 2022-12-28 ASSESSMENT — CONFRONTATIONAL VISUAL FIELD TEST (CVF)
OS_FINDINGS: FULL
OD_FINDINGS: FULL

## 2022-12-28 ASSESSMENT — VISUAL ACUITY
OD_BCVA: 20/60-2
OS_BCVA: 20/20

## 2022-12-28 ASSESSMENT — SPHEQUIV_DERIVED
OS_SPHEQUIV: 0
OD_SPHEQUIV: 1.375

## 2023-03-17 ENCOUNTER — APPOINTMENT (OUTPATIENT)
Dept: NEUROLOGY | Facility: CLINIC | Age: 73
End: 2023-03-17

## 2023-05-19 ENCOUNTER — APPOINTMENT (OUTPATIENT)
Dept: NEUROLOGY | Facility: CLINIC | Age: 73
End: 2023-05-19
Payer: MEDICARE

## 2023-05-19 VITALS
SYSTOLIC BLOOD PRESSURE: 131 MMHG | HEIGHT: 63 IN | HEART RATE: 60 BPM | BODY MASS INDEX: 33.31 KG/M2 | WEIGHT: 188 LBS | TEMPERATURE: 97.8 F | DIASTOLIC BLOOD PRESSURE: 77 MMHG

## 2023-05-19 PROCEDURE — 99213 OFFICE O/P EST LOW 20 MIN: CPT

## 2023-05-19 NOTE — HISTORY OF PRESENT ILLNESS
[FreeTextEntry1] : 72-year-old man right-handed history of essential tremor here for follow-up visit doing well, on Inderal LA 80 mg once a day.  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  \par Getting ready for vacation in [place], feeling well, satisfied with the medication.

## 2023-05-19 NOTE — DISCUSSION/SUMMARY
[FreeTextEntry1] : 72-year-old man with a history of essential tremor, stable on Inderal LA 80 mg once a day.  Reviewed and discussed treatment, no changes.\par Medication refill, advised to return in 1 year.

## 2023-05-19 NOTE — PHYSICAL EXAM
[General Appearance - Alert] : alert [General Appearance - In No Acute Distress] : in no acute distress [Oriented To Time, Place, And Person] : oriented to person, place, and time [Impaired Insight] : insight and judgment were intact [Affect] : the affect was normal [Person] : oriented to person [Place] : oriented to place [Time] : oriented to time [Fluency] : fluency intact [Cranial Nerves Optic (II)] : visual acuity intact bilaterally,  visual fields full to confrontation, pupils equal round and reactive to light [Cranial Nerves Oculomotor (III)] : extraocular motion intact [Cranial Nerves Trigeminal (V)] : facial sensation intact symmetrically [Cranial Nerves Facial (VII)] : face symmetrical [Cranial Nerves Vestibulocochlear (VIII)] : hearing was intact bilaterally [Cranial Nerves Accessory (XI - Cranial And Spinal)] : head turning and shoulder shrug symmetric [Cranial Nerves Hypoglossal (XII)] : there was no tongue deviation with protrusion [Motor Tone] : muscle tone was normal in all four extremities [Motor Strength] : muscle strength was normal in all four extremities [No Muscle Atrophy] : normal bulk in all four extremities [Motor Handedness Right-Handed] : the patient is right hand dominant [Paresis Pronator Drift Right-Sided] : no pronator drift on the right [Paresis Pronator Drift Left-Sided] : no pronator drift on the left [Sensation Tactile Decrease] : light touch was intact [Abnormal Walk] : normal gait [Balance] : balance was intact [Past-pointing] : there was no past-pointing [Tremor] : a tremor present [Dysdiadochokinesia Bilaterally] : not present [Coordination - Dysmetria Impaired Finger-to-Nose Bilateral] : not present

## 2024-01-01 NOTE — ED ADULT NURSE NOTE - NS PRO AD BILL OF RIGHTS
08/17/24 1441 08/17/24 1446   Vital Signs   BP (!) 90/61 (!) 103/72   MAP (mmHg) 71 83   BP Location Left arm Left leg   BP Method Automatic Automatic   Patient Position Lying Lying     Extremity Bps.   No

## 2024-03-15 ENCOUNTER — RX RENEWAL (OUTPATIENT)
Age: 74
End: 2024-03-15

## 2024-05-01 NOTE — DISCHARGE NOTE ADULT - NS AS DC AMI YN
Anxiety: increase sertraline to 100 mg  ( has been taking 50 mg )     Take the lorazepam as needed when feeling very anxious.  This is a temporary prescription.      High blood pressure:  increased lisinopril to 30 mg.  Please follow-up for 1 nurse only visit and office visit in 6 weeks.    no

## 2024-06-07 ENCOUNTER — APPOINTMENT (OUTPATIENT)
Dept: NEUROLOGY | Facility: CLINIC | Age: 74
End: 2024-06-07
Payer: MEDICARE

## 2024-06-07 VITALS
SYSTOLIC BLOOD PRESSURE: 208 MMHG | HEART RATE: 59 BPM | HEIGHT: 63 IN | WEIGHT: 195 LBS | BODY MASS INDEX: 34.55 KG/M2 | DIASTOLIC BLOOD PRESSURE: 100 MMHG | TEMPERATURE: 97.6 F

## 2024-06-07 PROCEDURE — 99214 OFFICE O/P EST MOD 30 MIN: CPT

## 2024-06-07 PROCEDURE — G2211 COMPLEX E/M VISIT ADD ON: CPT

## 2024-06-07 RX ORDER — PROPRANOLOL HYDROCHLORIDE 60 MG/1
60 TABLET ORAL
Qty: 60 | Refills: 5 | Status: DISCONTINUED | COMMUNITY
Start: 2022-04-08 | End: 2024-06-07

## 2024-06-07 RX ORDER — PROPRANOLOL HYDROCHLORIDE 80 MG/1
80 CAPSULE, EXTENDED RELEASE ORAL
Qty: 90 | Refills: 2 | Status: ACTIVE | COMMUNITY
Start: 2022-09-15 | End: 1900-01-01

## 2024-06-07 NOTE — PHYSICAL EXAM
[General Appearance - Alert] : alert [General Appearance - In No Acute Distress] : in no acute distress [Oriented To Time, Place, And Person] : oriented to person, place, and time [Impaired Insight] : insight and judgment were intact [Affect] : the affect was normal [Mood] : the mood was normal [Memory Recent] : recent memory was not impaired [Memory Remote] : remote memory was not impaired [Over the Past 2 Weeks, Have You Felt Down, Depressed, or Hopeless?] : 1.) Over the past 2 weeks, have you felt down, depressed, or hopeless? No [Over the Past 2 Weeks, Have You Felt Little Interest or Pleasure Doing Things?] : 2.) Over the past 2 weeks, have you felt little interest or pleasure doing things? No [Person] : oriented to person [Place] : oriented to place [Time] : oriented to time [Fluency] : fluency intact [Cranial Nerves Optic (II)] : visual acuity intact bilaterally,  visual fields full to confrontation, pupils equal round and reactive to light [Cranial Nerves Oculomotor (III)] : extraocular motion intact [Cranial Nerves Trigeminal (V)] : facial sensation intact symmetrically [Cranial Nerves Facial (VII)] : face symmetrical [Cranial Nerves Vestibulocochlear (VIII)] : hearing was intact bilaterally [Cranial Nerves Accessory (XI - Cranial And Spinal)] : head turning and shoulder shrug symmetric [Cranial Nerves Hypoglossal (XII)] : there was no tongue deviation with protrusion [Motor Tone] : muscle tone was normal in all four extremities [Motor Strength] : muscle strength was normal in all four extremities [No Muscle Atrophy] : normal bulk in all four extremities [Motor Handedness Right-Handed] : the patient is right hand dominant [Sensation Tactile Decrease] : light touch was intact [Abnormal Walk] : normal gait [Balance] : balance was intact [Past-pointing] : there was no past-pointing [Tremor] : a tremor present [Dysdiadochokinesia Bilaterally] : not present [Coordination - Dysmetria Impaired Finger-to-Nose Bilateral] : not present [1+] : Patella left 1+ [0] : Ankle jerk left 0

## 2024-06-07 NOTE — DISCUSSION/SUMMARY
[FreeTextEntry1] : 73-year-old man with a history of hypertension, gout, essential tremor, stable on propranolol ER 80 mg once a day for essential tremor. We reviewed and discussed treatment.  No change at this time. History of hypertension, today elevated.  No symptoms.  Discussed the importance of compliance with his medications, discussed the risk of vascular disease, stroke, heart disease congestive heart failure. Patient agrees, and he took his medication. Recommended repeat the blood pressure but he wants to go home will check it at home.  Advised to follow-up with primary care. Return to office,  9 months to 12 months

## 2024-06-07 NOTE — HISTORY OF PRESENT ILLNESS
[FreeTextEntry1] : 73-year-old man with a history of hypertension, gout, essential tremor here for follow-up visit.  Last time seen in May 2023. Denies any new medical issues, had a bout of gout about 2 weeks ago affecting the left wrist.  But now significantly improved.  History of essential tremor, postural kinetic tremor of the hands which affect writing or utilizing a small utensil.  Patient is a dentist by profession, still working. Findings of propranolol ER 80 mg once a day well-tolerated, helps to control the tremors we continue working. Not as active as in the past but still working at least 3 times a week.  No problems with the propranolol, no dizziness, no lightheadedness, no shortness of breath. His blood pressure today is elevated, but he admits he did not take his blood pressure medication.  He  just took it. No complaints of chest pain, palpitation, short of breath, no leg swelling, no dyspnea on exertion.

## 2024-07-05 ENCOUNTER — EMERGENCY (EMERGENCY)
Facility: HOSPITAL | Age: 74
LOS: 0 days | Discharge: HOME | End: 2024-07-05
Attending: EMERGENCY MEDICINE
Payer: MEDICARE

## 2024-07-05 VITALS
OXYGEN SATURATION: 98 % | DIASTOLIC BLOOD PRESSURE: 62 MMHG | SYSTOLIC BLOOD PRESSURE: 114 MMHG | HEART RATE: 72 BPM | TEMPERATURE: 99 F | RESPIRATION RATE: 17 BRPM

## 2024-07-05 VITALS
DIASTOLIC BLOOD PRESSURE: 81 MMHG | TEMPERATURE: 98 F | HEART RATE: 76 BPM | RESPIRATION RATE: 18 BRPM | OXYGEN SATURATION: 96 % | SYSTOLIC BLOOD PRESSURE: 169 MMHG | HEIGHT: 63 IN

## 2024-07-05 DIAGNOSIS — S99.911A UNSPECIFIED INJURY OF RIGHT ANKLE, INITIAL ENCOUNTER: ICD-10-CM

## 2024-07-05 DIAGNOSIS — S93.401A SPRAIN OF UNSPECIFIED LIGAMENT OF RIGHT ANKLE, INITIAL ENCOUNTER: ICD-10-CM

## 2024-07-05 DIAGNOSIS — N18.9 CHRONIC KIDNEY DISEASE, UNSPECIFIED: ICD-10-CM

## 2024-07-05 DIAGNOSIS — S69.90XA UNSPECIFIED INJURY OF UNSPECIFIED WRIST, HAND AND FINGER(S), INITIAL ENCOUNTER: Chronic | ICD-10-CM
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
## 2024-07-05 DIAGNOSIS — I12.9 HYPERTENSIVE CHRONIC KIDNEY DISEASE WITH STAGE 1 THROUGH STAGE 4 CHRONIC KIDNEY DISEASE, OR UNSPECIFIED CHRONIC KIDNEY DISEASE: ICD-10-CM
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
## 2024-07-05 DIAGNOSIS — Z87.81 PERSONAL HISTORY OF (HEALED) TRAUMATIC FRACTURE: Chronic | ICD-10-CM

## 2024-07-05 PROCEDURE — 73610 X-RAY EXAM OF ANKLE: CPT | Mod: 26,RT

## 2024-07-05 PROCEDURE — 73630 X-RAY EXAM OF FOOT: CPT | Mod: 26,RT

## 2024-07-05 PROCEDURE — 73630 X-RAY EXAM OF FOOT: CPT | Mod: RT

## 2024-07-05 PROCEDURE — 99284 EMERGENCY DEPT VISIT MOD MDM: CPT

## 2024-07-05 PROCEDURE — 99284 EMERGENCY DEPT VISIT MOD MDM: CPT | Mod: 25

## 2024-07-05 PROCEDURE — 73610 X-RAY EXAM OF ANKLE: CPT | Mod: RT

## 2024-07-05 RX ORDER — ACETAMINOPHEN 325 MG
650 TABLET ORAL ONCE
Refills: 0 | Status: DISCONTINUED | OUTPATIENT
Start: 2024-07-05 | End: 2024-07-05

## 2025-01-10 ENCOUNTER — APPOINTMENT (OUTPATIENT)
Dept: NEUROLOGY | Facility: CLINIC | Age: 75
End: 2025-01-10
Payer: MEDICARE

## 2025-01-10 VITALS
TEMPERATURE: 98.2 F | DIASTOLIC BLOOD PRESSURE: 84 MMHG | HEIGHT: 63 IN | WEIGHT: 195 LBS | BODY MASS INDEX: 34.55 KG/M2 | SYSTOLIC BLOOD PRESSURE: 164 MMHG | HEART RATE: 70 BPM

## 2025-01-10 PROCEDURE — 99213 OFFICE O/P EST LOW 20 MIN: CPT

## 2025-05-05 ENCOUNTER — RX RENEWAL (OUTPATIENT)
Age: 75
End: 2025-05-05